# Patient Record
Sex: FEMALE | Race: WHITE | Employment: UNEMPLOYED | URBAN - METROPOLITAN AREA
[De-identification: names, ages, dates, MRNs, and addresses within clinical notes are randomized per-mention and may not be internally consistent; named-entity substitution may affect disease eponyms.]

---

## 2019-11-11 ENCOUNTER — OFFICE VISIT (OUTPATIENT)
Dept: FAMILY MEDICINE CLINIC | Facility: CLINIC | Age: 34
End: 2019-11-11
Payer: COMMERCIAL

## 2019-11-11 VITALS
BODY MASS INDEX: 28.06 KG/M2 | SYSTOLIC BLOOD PRESSURE: 100 MMHG | HEART RATE: 88 BPM | DIASTOLIC BLOOD PRESSURE: 64 MMHG | OXYGEN SATURATION: 97 % | RESPIRATION RATE: 16 BRPM | HEIGHT: 63 IN | WEIGHT: 158.38 LBS | TEMPERATURE: 98 F

## 2019-11-11 DIAGNOSIS — J20.9 ACUTE BRONCHITIS, UNSPECIFIED ORGANISM: Primary | ICD-10-CM

## 2019-11-11 PROCEDURE — 99203 OFFICE O/P NEW LOW 30 MIN: CPT | Performed by: PHYSICIAN ASSISTANT

## 2019-11-11 RX ORDER — AZITHROMYCIN 250 MG/1
TABLET, FILM COATED ORAL
Qty: 6 TABLET | Refills: 0 | Status: SHIPPED | OUTPATIENT
Start: 2019-11-11 | End: 2019-12-05

## 2019-11-11 RX ORDER — ESTRADIOL 1 MG/1
1 TABLET ORAL DAILY
COMMUNITY
End: 2019-12-05

## 2019-11-11 RX ORDER — PRENATAL VIT/IRON FUM/FOLIC AC 27MG-0.8MG
1 TABLET ORAL DAILY
COMMUNITY

## 2019-11-11 RX ORDER — CHLORAL HYDRATE 500 MG
1000 CAPSULE ORAL DAILY
COMMUNITY

## 2019-11-11 NOTE — PROGRESS NOTES
CHIEF COMPLAINT:   Patient presents with:  Cough: chest hurts, stuffy nose x 2 weeks         HPI:   Khai Balderrama is a 37year old female who presents for cough for 3 weeks.  Cough is sometimes productive and the patient is getting into coughing fits that a nourished,in no apparent distress  SKIN: no rashes, no suspicious lesions  EYES: EOMI. PERRL. Conjunctiva normal.  Cornea clear. Lid margins normal.  HENT: Atraumatic, normocephalic. TM's clear bilaterally.   Nostrils patent, nasal mucosa pink and non-in

## 2019-11-11 NOTE — PATIENT INSTRUCTIONS
Patient Declined AVS    Verbal Instructions given      1. Azithromycin  2. Robitussin  3. Increase fluids/rest  4.  Follow up with PCP

## 2019-11-13 ENCOUNTER — TELEPHONE (OUTPATIENT)
Dept: OBGYN CLINIC | Facility: CLINIC | Age: 34
End: 2019-11-13

## 2019-11-13 NOTE — TELEPHONE ENCOUNTER
Returned patient's call. She had an embryo transfer at [facility]. Last appt with them was today. Has appt scheduled with us for 19.    Hx of IVF pregnancy,  in 2017, GDM    Medical problems: denies  Surgeries: bernard   Current medications: estrac

## 2019-11-13 NOTE — TELEPHONE ENCOUNTER
Patient calling to transfer OB care  GA 7 wks 6 days  JOHANA   Insurance [employer] PPO  Good time to return phone call anytime    IVF transfer, will have records sent

## 2019-12-03 ENCOUNTER — TELEPHONE (OUTPATIENT)
Dept: OBGYN CLINIC | Facility: CLINIC | Age: 34
End: 2019-12-03

## 2019-12-03 NOTE — TELEPHONE ENCOUNTER
Faxed over request for medical records to be recieved from [facility]. Received confirmation from fax.

## 2019-12-05 ENCOUNTER — INITIAL PRENATAL (OUTPATIENT)
Dept: OBGYN CLINIC | Facility: CLINIC | Age: 34
End: 2019-12-05
Payer: COMMERCIAL

## 2019-12-05 VITALS
HEIGHT: 63 IN | BODY MASS INDEX: 27.46 KG/M2 | SYSTOLIC BLOOD PRESSURE: 114 MMHG | WEIGHT: 155 LBS | DIASTOLIC BLOOD PRESSURE: 60 MMHG

## 2019-12-05 DIAGNOSIS — Z23 NEED FOR VACCINATION: ICD-10-CM

## 2019-12-05 DIAGNOSIS — Z12.4 SCREENING FOR MALIGNANT NEOPLASM OF THE CERVIX: ICD-10-CM

## 2019-12-05 DIAGNOSIS — Z36.9 PRENATAL SCREENING ENCOUNTER: Primary | ICD-10-CM

## 2019-12-05 DIAGNOSIS — O09.521 AMA (ADVANCED MATERNAL AGE) MULTIGRAVIDA 35+, FIRST TRIMESTER: ICD-10-CM

## 2019-12-05 DIAGNOSIS — O09.811 PREGNANCY RESULTING FROM ASSISTED REPRODUCTIVE TECHNOLOGY IN FIRST TRIMESTER: ICD-10-CM

## 2019-12-05 PROCEDURE — 88175 CYTOPATH C/V AUTO FLUID REDO: CPT | Performed by: OBSTETRICS & GYNECOLOGY

## 2019-12-05 PROCEDURE — 87086 URINE CULTURE/COLONY COUNT: CPT | Performed by: OBSTETRICS & GYNECOLOGY

## 2019-12-05 PROCEDURE — 90686 IIV4 VACC NO PRSV 0.5 ML IM: CPT | Performed by: OBSTETRICS & GYNECOLOGY

## 2019-12-05 PROCEDURE — 90471 IMMUNIZATION ADMIN: CPT | Performed by: OBSTETRICS & GYNECOLOGY

## 2019-12-05 PROCEDURE — 81002 URINALYSIS NONAUTO W/O SCOPE: CPT | Performed by: OBSTETRICS & GYNECOLOGY

## 2019-12-05 NOTE — PROGRESS NOTES
NOB  IVF pregnancy  ET 10/8/19, did not have PGD  PMH: neg  PSH: cholecystectomy  History of gestational DM, discussed, she will watch diet  Prenatal care and course discussed with patient including ultrasounds, genetic testing options, frequency of visits

## 2019-12-16 ENCOUNTER — TELEPHONE (OUTPATIENT)
Dept: OBGYN CLINIC | Facility: CLINIC | Age: 34
End: 2019-12-16

## 2019-12-21 ENCOUNTER — OFFICE VISIT (OUTPATIENT)
Dept: FAMILY MEDICINE CLINIC | Facility: CLINIC | Age: 34
End: 2019-12-21
Payer: COMMERCIAL

## 2019-12-21 VITALS
BODY MASS INDEX: 27.82 KG/M2 | HEIGHT: 63 IN | WEIGHT: 157 LBS | HEART RATE: 80 BPM | DIASTOLIC BLOOD PRESSURE: 71 MMHG | TEMPERATURE: 98 F | RESPIRATION RATE: 16 BRPM | SYSTOLIC BLOOD PRESSURE: 116 MMHG | OXYGEN SATURATION: 99 %

## 2019-12-21 DIAGNOSIS — R51.9 SINUS HEADACHE: Primary | ICD-10-CM

## 2019-12-21 DIAGNOSIS — J06.9 VIRAL UPPER RESPIRATORY TRACT INFECTION: ICD-10-CM

## 2019-12-21 PROCEDURE — 99213 OFFICE O/P EST LOW 20 MIN: CPT | Performed by: FAMILY MEDICINE

## 2019-12-21 NOTE — PROGRESS NOTES
Patient presents with:  Cough: Sinus pressure and headaches- she state she is worried she might have pink eye- she is 14 weeks pregnant      HPI:    Patient is here with ALLISONI symjasen and was given z-ashely, this was about 1 month ago.  She got better with the Immunizations:    Immunization History  Administered            Date(s) Administered    FLULAVAL 6 months & older 0.5 ml Prefilled syringe ([id number])                          12/05/2019        Physical Exam  /71   Pulse 80   Temp 98 °F (36.7 °C) (Tempor The sinuses are air-filled spaces within the bones of the face. They connect to the inside of the nose. Sinusitis is an inflammation of the tissue lining the sinus cavity.  Sinus inflammation can occur during a cold or hay fever (allergies to pollens and ot · You may use over-the-counter decongestants unless a similar medicine was prescribed. Nasal sprays or drops work the fastest. Use one that contains phenylephrine or oxymetazoline. First blow your nose gently to remove mucus. Then apply the spray or drops. · You may use over-the-counter medicine to control pain and fever, unless another pain medicine was prescribed. Talk with your doctor before using acetaminophen or ibuprofen if you have chronic liver or kidney disease.  Also talk with your doctor if you hav

## 2019-12-21 NOTE — PATIENT INSTRUCTIONS
I recommend Mucinex DM, Vicks vaporub, Tylenol. Sinus Headache    The sinuses are air-filled spaces within the bones of the face. They connect to the inside of the nose. Sinusitis is an inflammation of the tissue lining the sinus cavity.  Sinus infl the spray or drops. Don't use decongestant nasal sprays or drops more often than the label says or for more than 3 days. This can make symptoms worse. Nasal sprays or drops prescribed by your doctor typically do not have these limits.  Check with your docto had a stomach ulcer. Aspirin should never be used in anyone under 25years of age who has a fever. It may cause a life-threatening condition called Reye syndrome.   · If antibiotics were given, finish all of them, even if you are feeling better after a few

## 2020-01-02 NOTE — TELEPHONE ENCOUNTER
Received more medical records from [facility]. Given to ma's in [place]

## 2020-01-06 ENCOUNTER — ROUTINE PRENATAL (OUTPATIENT)
Dept: OBGYN CLINIC | Facility: CLINIC | Age: 35
End: 2020-01-06
Payer: COMMERCIAL

## 2020-01-06 VITALS — DIASTOLIC BLOOD PRESSURE: 64 MMHG | SYSTOLIC BLOOD PRESSURE: 120 MMHG | BODY MASS INDEX: 28 KG/M2 | WEIGHT: 158.81 LBS

## 2020-01-06 DIAGNOSIS — Z36.9 PRENATAL SCREENING ENCOUNTER: ICD-10-CM

## 2020-01-06 DIAGNOSIS — O09.819 PREGNANCY RESULTING FROM IN VITRO FERTILIZATION, ANTEPARTUM: Primary | ICD-10-CM

## 2020-01-06 DIAGNOSIS — O09.529 ANTEPARTUM MULTIGRAVIDA OF ADVANCED MATERNAL AGE: ICD-10-CM

## 2020-01-06 LAB — MULTISTIX LOT#: NORMAL NUMERIC

## 2020-01-06 PROCEDURE — 81002 URINALYSIS NONAUTO W/O SCOPE: CPT | Performed by: OBSTETRICS & GYNECOLOGY

## 2020-01-06 NOTE — PROGRESS NOTES
IRVIN.   Doing well. No complaints. Denies abdominal/pelvic pain or vaginal bleeding. Had some sinus congestion and was treating with Mucinex. She has intermittent headaches while she is sick.   Advised to take Tylenol and improve her sleeping habits as

## 2020-01-07 ENCOUNTER — LAB ENCOUNTER (OUTPATIENT)
Dept: LAB | Age: 35
End: 2020-01-07
Attending: OBSTETRICS & GYNECOLOGY
Payer: COMMERCIAL

## 2020-01-07 ENCOUNTER — MED REC SCAN ONLY (OUTPATIENT)
Dept: OBGYN CLINIC | Facility: CLINIC | Age: 35
End: 2020-01-07

## 2020-01-07 DIAGNOSIS — O09.521 AMA (ADVANCED MATERNAL AGE) MULTIGRAVIDA 35+, FIRST TRIMESTER: ICD-10-CM

## 2020-01-07 LAB
ANTIBODY SCREEN: NEGATIVE
BASOPHILS # BLD AUTO: 0.04 X10(3) UL (ref 0–0.2)
BASOPHILS NFR BLD AUTO: 0.5 %
DEPRECATED RDW RBC AUTO: 47.9 FL (ref 35.1–46.3)
EOSINOPHIL # BLD AUTO: 0.06 X10(3) UL (ref 0–0.7)
EOSINOPHIL NFR BLD AUTO: 0.7 %
ERYTHROCYTE [DISTWIDTH] IN BLOOD BY AUTOMATED COUNT: 14.4 % (ref 11–15)
HBV SURFACE AG SER-ACNC: <0.1 [IU]/L
HBV SURFACE AG SERPL QL IA: NONREACTIVE
HCT VFR BLD AUTO: 34.4 % (ref 35–48)
HGB BLD-MCNC: 11.1 G/DL (ref 12–16)
IMM GRANULOCYTES # BLD AUTO: 0.02 X10(3) UL (ref 0–1)
IMM GRANULOCYTES NFR BLD: 0.2 %
LYMPHOCYTES # BLD AUTO: 1.53 X10(3) UL (ref 1–4)
LYMPHOCYTES NFR BLD AUTO: 18.2 %
MCH RBC QN AUTO: 29.6 PG (ref 26–34)
MCHC RBC AUTO-ENTMCNC: 32.3 G/DL (ref 31–37)
MCV RBC AUTO: 91.7 FL (ref 80–100)
MONOCYTES # BLD AUTO: 0.44 X10(3) UL (ref 0.1–1)
MONOCYTES NFR BLD AUTO: 5.2 %
NEUTROPHILS # BLD AUTO: 6.3 X10 (3) UL (ref 1.5–7.7)
NEUTROPHILS # BLD AUTO: 6.3 X10(3) UL (ref 1.5–7.7)
NEUTROPHILS NFR BLD AUTO: 75.2 %
PLATELET # BLD AUTO: 251 10(3)UL (ref 150–450)
RBC # BLD AUTO: 3.75 X10(6)UL (ref 3.8–5.3)
RH BLOOD TYPE: POSITIVE
RUBV IGG SER QL: POSITIVE
RUBV IGG SER-ACNC: 144.1 IU/ML (ref 10–?)
T PALLIDUM AB SER QL IA: NONREACTIVE
WBC # BLD AUTO: 8.4 X10(3) UL (ref 4–11)

## 2020-01-07 PROCEDURE — 86762 RUBELLA ANTIBODY: CPT | Performed by: OBSTETRICS & GYNECOLOGY

## 2020-01-07 PROCEDURE — 87340 HEPATITIS B SURFACE AG IA: CPT | Performed by: OBSTETRICS & GYNECOLOGY

## 2020-01-07 PROCEDURE — 87389 HIV-1 AG W/HIV-1&-2 AB AG IA: CPT | Performed by: OBSTETRICS & GYNECOLOGY

## 2020-01-07 PROCEDURE — 86780 TREPONEMA PALLIDUM: CPT | Performed by: OBSTETRICS & GYNECOLOGY

## 2020-01-07 PROCEDURE — 86901 BLOOD TYPING SEROLOGIC RH(D): CPT | Performed by: OBSTETRICS & GYNECOLOGY

## 2020-01-07 PROCEDURE — 36415 COLL VENOUS BLD VENIPUNCTURE: CPT | Performed by: OBSTETRICS & GYNECOLOGY

## 2020-01-07 PROCEDURE — 85025 COMPLETE CBC W/AUTO DIFF WBC: CPT | Performed by: OBSTETRICS & GYNECOLOGY

## 2020-01-07 PROCEDURE — 86900 BLOOD TYPING SEROLOGIC ABO: CPT | Performed by: OBSTETRICS & GYNECOLOGY

## 2020-01-07 PROCEDURE — 86850 RBC ANTIBODY SCREEN: CPT | Performed by: OBSTETRICS & GYNECOLOGY

## 2020-01-13 ENCOUNTER — TELEPHONE (OUTPATIENT)
Dept: OBGYN CLINIC | Facility: CLINIC | Age: 35
End: 2020-01-13

## 2020-01-14 NOTE — TELEPHONE ENCOUNTER
Normal; female fetus. Notified pt of normal results. Pt does not want to know sex at this time. Pt requesting that we call her  with sex of the baby. Called  on cell #; left message to call back.

## 2020-02-03 ENCOUNTER — ROUTINE PRENATAL (OUTPATIENT)
Dept: OBGYN CLINIC | Facility: CLINIC | Age: 35
End: 2020-02-03
Payer: COMMERCIAL

## 2020-02-03 ENCOUNTER — MED REC SCAN ONLY (OUTPATIENT)
Dept: OBGYN CLINIC | Facility: CLINIC | Age: 35
End: 2020-02-03

## 2020-02-03 VITALS — BODY MASS INDEX: 29 KG/M2 | SYSTOLIC BLOOD PRESSURE: 96 MMHG | WEIGHT: 163.81 LBS | DIASTOLIC BLOOD PRESSURE: 60 MMHG

## 2020-02-03 DIAGNOSIS — Z86.32 HX OF GESTATIONAL DIABETES IN PRIOR PREGNANCY, CURRENTLY PREGNANT, SECOND TRIMESTER: ICD-10-CM

## 2020-02-03 DIAGNOSIS — O09.529 ANTEPARTUM MULTIGRAVIDA OF ADVANCED MATERNAL AGE: ICD-10-CM

## 2020-02-03 DIAGNOSIS — O09.812 PREGNANCY RESULTING FROM ASSISTED REPRODUCTIVE TECHNOLOGY IN SECOND TRIMESTER: ICD-10-CM

## 2020-02-03 DIAGNOSIS — O09.292 HX OF GESTATIONAL DIABETES IN PRIOR PREGNANCY, CURRENTLY PREGNANT, SECOND TRIMESTER: ICD-10-CM

## 2020-02-03 DIAGNOSIS — Z3A.19 19 WEEKS GESTATION OF PREGNANCY: Primary | ICD-10-CM

## 2020-02-03 LAB — MULTISTIX LOT#: NORMAL NUMERIC

## 2020-02-03 PROCEDURE — 81002 URINALYSIS NONAUTO W/O SCOPE: CPT | Performed by: OBSTETRICS & GYNECOLOGY

## 2020-02-03 NOTE — PATIENT INSTRUCTIONS
Medications Safe in Pregnancy  The following over-the-counter medications may be taken safely after 12 weeks gestation by any patient who is pregnant. Please follow the instructions on the package for adult dosage.   If you experience any symptoms prior to stress to your body. One way in which the stress may manifest itself is through an abnormality in sugar metabolism. In non-pregnant patients, this abnormality (a lack of insulin) is known as diabetes.     During pregnancy, the insulin normally secreted ma further testing is necessary.   If the blood glucose is abnormal, this indicates an increased risk of gestational diabetes and will require a formal three hour glucose tolerance test.  This test will be done within two weeks following your abnormal one hour

## 2020-02-03 NOTE — PROGRESS NOTES
IRVIN  Doing well  No complaints.  No LOF/VB/uctx  RH +  Genetic testing nl cf dna (first, quad/AFP)  Anatomy Scan level 2 next week (18-20weeks)  Hx of gdm with previous pregnancy  To do 1hr gct before next visit  rtc 4 wk

## 2020-02-10 ENCOUNTER — TELEPHONE (OUTPATIENT)
Dept: OBGYN CLINIC | Facility: CLINIC | Age: 35
End: 2020-02-10

## 2020-02-10 ENCOUNTER — TELEPHONE (OUTPATIENT)
Dept: PERINATAL CARE | Facility: HOSPITAL | Age: 35
End: 2020-02-10

## 2020-02-10 NOTE — TELEPHONE ENCOUNTER
Spoke to Ollie Medina with MFM (she called office). She stated patient was >30 minutes late for appointment. She was rescheduled until next week. Patient was very upset.  Ollie Medina wanted our office to be aware it was not on their part that she had to be cancelle

## 2020-02-17 NOTE — PROGRESS NOTES
Outpatient Maternal-Fetal Medicine Consultation    Dear Dr. Raj Bran    Thank you for requesting ultrasound evaluation and maternal fetal medicine consultation on your patient Kristi Cooper.   As you are aware she is a 37year old female  with a sing survey.   ____________________________________________________________________________  Dating:  LMP: 09/19/2019 EDC: 06/25/2020 GA by LMP: 21w5d  Current Scan on: 02/18/2020 EDC: 06/27/2020 GA by current scan: 21w3d  The calculation of the gestational age mm.  ____________________________________________________________________________    See PACS/Imaging Tab For Complete Ultrasound Report  I interpreted the results and reviewed them with the patient.     DISCUSSION  During her visit we discussed and reviewe current non-invasive screening options. Currently non-invasive pregnancy testing (NIPT) offers the highest detection rate (with the lowest false positive rate) for the detection of fetal aneuploidy amongst high-risk patients.   The limitations of detailed for allowing me to participate in the care of your patient. Please do not hesitate to contact me if additional questions or concerns arise. Aleksandra Quiros. Maryann Ying M.D.     The majority of the time (>50%) was spent in review of records, consultation and coor

## 2020-02-18 ENCOUNTER — OFFICE VISIT (OUTPATIENT)
Dept: PERINATAL CARE | Facility: HOSPITAL | Age: 35
End: 2020-02-18
Attending: OBSTETRICS & GYNECOLOGY
Payer: COMMERCIAL

## 2020-02-18 VITALS
DIASTOLIC BLOOD PRESSURE: 63 MMHG | BODY MASS INDEX: 29 KG/M2 | HEART RATE: 102 BPM | SYSTOLIC BLOOD PRESSURE: 93 MMHG | WEIGHT: 163 LBS

## 2020-02-18 DIAGNOSIS — O09.529 ANTEPARTUM MULTIGRAVIDA OF ADVANCED MATERNAL AGE: ICD-10-CM

## 2020-02-18 DIAGNOSIS — O09.819 PREGNANCY RESULTING FROM IN VITRO FERTILIZATION, ANTEPARTUM: ICD-10-CM

## 2020-02-18 PROCEDURE — 76811 OB US DETAILED SNGL FETUS: CPT | Performed by: OBSTETRICS & GYNECOLOGY

## 2020-02-18 PROCEDURE — 99243 OFF/OP CNSLTJ NEW/EST LOW 30: CPT | Performed by: OBSTETRICS & GYNECOLOGY

## 2020-03-02 ENCOUNTER — LAB ENCOUNTER (OUTPATIENT)
Dept: LAB | Age: 35
End: 2020-03-02
Attending: OBSTETRICS & GYNECOLOGY
Payer: COMMERCIAL

## 2020-03-02 ENCOUNTER — ROUTINE PRENATAL (OUTPATIENT)
Dept: OBGYN CLINIC | Facility: CLINIC | Age: 35
End: 2020-03-02
Payer: COMMERCIAL

## 2020-03-02 VITALS — DIASTOLIC BLOOD PRESSURE: 64 MMHG | WEIGHT: 166 LBS | BODY MASS INDEX: 29 KG/M2 | SYSTOLIC BLOOD PRESSURE: 108 MMHG

## 2020-03-02 DIAGNOSIS — O09.529 ANTEPARTUM MULTIGRAVIDA OF ADVANCED MATERNAL AGE: ICD-10-CM

## 2020-03-02 DIAGNOSIS — O09.292 HX OF GESTATIONAL DIABETES IN PRIOR PREGNANCY, CURRENTLY PREGNANT, SECOND TRIMESTER: ICD-10-CM

## 2020-03-02 DIAGNOSIS — O09.812 PREGNANCY RESULTING FROM ASSISTED REPRODUCTIVE TECHNOLOGY IN SECOND TRIMESTER: ICD-10-CM

## 2020-03-02 DIAGNOSIS — O09.819 PREGNANCY RESULTING FROM IN VITRO FERTILIZATION, ANTEPARTUM: Primary | ICD-10-CM

## 2020-03-02 DIAGNOSIS — Z3A.19 19 WEEKS GESTATION OF PREGNANCY: ICD-10-CM

## 2020-03-02 DIAGNOSIS — Z86.32 HX OF GESTATIONAL DIABETES IN PRIOR PREGNANCY, CURRENTLY PREGNANT, SECOND TRIMESTER: ICD-10-CM

## 2020-03-02 LAB
BASOPHILS # BLD AUTO: 0.02 X10(3) UL (ref 0–0.2)
BASOPHILS NFR BLD AUTO: 0.2 %
DEPRECATED RDW RBC AUTO: 50.9 FL (ref 35.1–46.3)
EOSINOPHIL # BLD AUTO: 0.07 X10(3) UL (ref 0–0.7)
EOSINOPHIL NFR BLD AUTO: 0.7 %
ERYTHROCYTE [DISTWIDTH] IN BLOOD BY AUTOMATED COUNT: 14.9 % (ref 11–15)
GLUCOSE 1H P GLC SERPL-MCNC: 156 MG/DL
HCT VFR BLD AUTO: 34.5 % (ref 35–48)
HGB BLD-MCNC: 11 G/DL (ref 12–16)
IMM GRANULOCYTES # BLD AUTO: 0.03 X10(3) UL (ref 0–1)
IMM GRANULOCYTES NFR BLD: 0.3 %
LYMPHOCYTES # BLD AUTO: 1.59 X10(3) UL (ref 1–4)
LYMPHOCYTES NFR BLD AUTO: 15.5 %
MCH RBC QN AUTO: 30.1 PG (ref 26–34)
MCHC RBC AUTO-ENTMCNC: 31.9 G/DL (ref 31–37)
MCV RBC AUTO: 94.3 FL (ref 80–100)
MONOCYTES # BLD AUTO: 0.56 X10(3) UL (ref 0.1–1)
MONOCYTES NFR BLD AUTO: 5.5 %
MULTISTIX LOT#: NORMAL NUMERIC
NEUTROPHILS # BLD AUTO: 8 X10 (3) UL (ref 1.5–7.7)
NEUTROPHILS # BLD AUTO: 8 X10(3) UL (ref 1.5–7.7)
NEUTROPHILS NFR BLD AUTO: 77.8 %
PLATELET # BLD AUTO: 198 10(3)UL (ref 150–450)
RBC # BLD AUTO: 3.66 X10(6)UL (ref 3.8–5.3)
WBC # BLD AUTO: 10.3 X10(3) UL (ref 4–11)

## 2020-03-02 PROCEDURE — 81002 URINALYSIS NONAUTO W/O SCOPE: CPT | Performed by: OBSTETRICS & GYNECOLOGY

## 2020-03-02 PROCEDURE — 36415 COLL VENOUS BLD VENIPUNCTURE: CPT | Performed by: OBSTETRICS & GYNECOLOGY

## 2020-03-02 PROCEDURE — 82950 GLUCOSE TEST: CPT | Performed by: OBSTETRICS & GYNECOLOGY

## 2020-03-02 PROCEDURE — 85025 COMPLETE CBC W/AUTO DIFF WBC: CPT | Performed by: OBSTETRICS & GYNECOLOGY

## 2020-03-02 NOTE — PROGRESS NOTES
IRVIN - 23w4d  Doing well. Denies LOF/VB/uctx. +FM.    /64   Wt 166 lb (75.3 kg)   LMP 09/11/2019 (Approximate)   BMI 29.41 kg/m²   RH pos  S/P Anatomy scan - nl level II   1 hr glucose and CBC - ordered    RTC in 4 weeks

## 2020-03-03 DIAGNOSIS — O99.810 ABNORMAL GLUCOSE TOLERANCE TEST (GTT) DURING PREGNANCY, ANTEPARTUM: Primary | ICD-10-CM

## 2020-03-03 NOTE — PROGRESS NOTES
Patient returned call. Reported results and instructed on 3 hr glucose. Questions answered and patient states understanding. Order placed.

## 2020-03-09 NOTE — PROGRESS NOTES
[place] [place]    Dear Dr. Rachael Jara    Thank you for requesting ultrasound evaluation and maternal fetal medicine consultation on your patient Genaro Lr.   As you are aware she is a 37year old female  with a sutherland p of the trachea  Rhythm:  Sinus    ____________________________________________________________________________    See PACS/Imaging Tab For Complete Ultrasound Report  I interpreted the results and reviewed them with the patient.     DISCUSSION  During her v

## 2020-03-10 ENCOUNTER — OFFICE VISIT (OUTPATIENT)
Dept: PERINATAL CARE | Facility: HOSPITAL | Age: 35
End: 2020-03-10
Attending: OBSTETRICS & GYNECOLOGY
Payer: COMMERCIAL

## 2020-03-10 VITALS
BODY MASS INDEX: 29 KG/M2 | HEART RATE: 102 BPM | DIASTOLIC BLOOD PRESSURE: 57 MMHG | WEIGHT: 166 LBS | SYSTOLIC BLOOD PRESSURE: 93 MMHG

## 2020-03-10 DIAGNOSIS — O09.529 ANTEPARTUM MULTIGRAVIDA OF ADVANCED MATERNAL AGE: ICD-10-CM

## 2020-03-10 DIAGNOSIS — O09.819 PREGNANCY RESULTING FROM IN VITRO FERTILIZATION, ANTEPARTUM: ICD-10-CM

## 2020-03-10 PROCEDURE — 93325 DOPPLER ECHO COLOR FLOW MAPG: CPT | Performed by: OBSTETRICS & GYNECOLOGY

## 2020-03-10 PROCEDURE — 99214 OFFICE O/P EST MOD 30 MIN: CPT | Performed by: OBSTETRICS & GYNECOLOGY

## 2020-03-10 PROCEDURE — 76827 ECHO EXAM OF FETAL HEART: CPT | Performed by: OBSTETRICS & GYNECOLOGY

## 2020-03-10 PROCEDURE — 76825 ECHO EXAM OF FETAL HEART: CPT | Performed by: OBSTETRICS & GYNECOLOGY

## 2020-03-17 ENCOUNTER — TELEPHONE (OUTPATIENT)
Dept: OBGYN CLINIC | Facility: CLINIC | Age: 35
End: 2020-03-17

## 2020-03-17 NOTE — TELEPHONE ENCOUNTER
Pt calling states she is to do a 3 hr glucose test but isn't sure if she should wait until next appt on the 30th or get done as soon as possible. Concerned with everything going on if its safe to go to the lab.     Please advise    Future Appointments   Da

## 2020-03-30 ENCOUNTER — MED REC SCAN ONLY (OUTPATIENT)
Dept: OBGYN CLINIC | Facility: CLINIC | Age: 35
End: 2020-03-30

## 2020-03-30 ENCOUNTER — ROUTINE PRENATAL (OUTPATIENT)
Dept: OBGYN CLINIC | Facility: CLINIC | Age: 35
End: 2020-03-30
Payer: COMMERCIAL

## 2020-03-30 VITALS — DIASTOLIC BLOOD PRESSURE: 64 MMHG | BODY MASS INDEX: 30 KG/M2 | WEIGHT: 168.38 LBS | SYSTOLIC BLOOD PRESSURE: 106 MMHG

## 2020-03-30 DIAGNOSIS — O09.529 ANTEPARTUM MULTIGRAVIDA OF ADVANCED MATERNAL AGE: ICD-10-CM

## 2020-03-30 DIAGNOSIS — Z36.9 PRENATAL SCREENING ENCOUNTER: Primary | ICD-10-CM

## 2020-03-30 PROBLEM — O24.410 DIET CONTROLLED GESTATIONAL DIABETES MELLITUS (GDM) IN THIRD TRIMESTER (HCC): Status: ACTIVE | Noted: 2020-03-30

## 2020-03-30 PROBLEM — O24.410 DIET CONTROLLED GESTATIONAL DIABETES MELLITUS (GDM) IN THIRD TRIMESTER: Status: ACTIVE | Noted: 2020-03-30

## 2020-03-30 LAB — MULTISTIX LOT#: NORMAL NUMERIC

## 2020-03-30 PROCEDURE — 81002 URINALYSIS NONAUTO W/O SCOPE: CPT | Performed by: OBSTETRICS & GYNECOLOGY

## 2020-03-30 NOTE — PATIENT INSTRUCTIONS
Tdap Vaccine: What You Need To Know    1. Why Get Vaccinated:    · Tetanus, diphtheria, and pertussis can be very serious diseases, even for adolescents and adults. Tdap vaccine can protect us from these diseases.     · Tetanus (Lockjaw) causes painful mu A similar vaccine, called Td, protects from tetanus and diphtheria, but not pertussis. A Td booster should be given every 10 years. Tdap may be given as one of these boosters if you have not already gotten a dose.   Tdap may also be given after a severe

## 2020-03-31 ENCOUNTER — TELEPHONE (OUTPATIENT)
Dept: OBGYN CLINIC | Facility: CLINIC | Age: 35
End: 2020-03-31

## 2020-03-31 NOTE — TELEPHONE ENCOUNTER
Pt needs a glucometer and supplies; does not need to see diabetic educator. Left message for Diabetic Educator to call back. Looking for direction on type of glucometer to order based on patient insurance.

## 2020-04-01 RX ORDER — LANCETS 33 GAUGE
1 EACH MISCELLANEOUS 4 TIMES DAILY
Qty: 1 BOX | Refills: 5 | Status: SHIPPED | OUTPATIENT
Start: 2020-04-01 | End: 2020-08-17

## 2020-04-01 RX ORDER — BLOOD-GLUCOSE METER
EACH MISCELLANEOUS
Qty: 1 KIT | Refills: 0 | Status: SHIPPED | OUTPATIENT
Start: 2020-04-01 | End: 2020-08-17

## 2020-04-01 RX ORDER — BLOOD SUGAR DIAGNOSTIC
STRIP MISCELLANEOUS
Qty: 100 STRIP | Refills: 5 | Status: ON HOLD | OUTPATIENT
Start: 2020-04-01 | End: 2020-06-29

## 2020-04-01 NOTE — TELEPHONE ENCOUNTER
Left a third message for Diabetic Educator. Will attempt to order a glucometer and supplies. Will f/u with pharmacy if denied. [facility] [employer] with supplies.

## 2020-04-06 ENCOUNTER — TELEPHONE (OUTPATIENT)
Dept: OBGYN CLINIC | Facility: CLINIC | Age: 35
End: 2020-04-06

## 2020-04-06 NOTE — TELEPHONE ENCOUNTER
Pt called in today having some questions about her sugar levels. There have been a couple times that it was low and she got dizzy in between the meals of breakfast and lunch.  Please advise

## 2020-04-06 NOTE — TELEPHONE ENCOUNTER
[id number]   Patient called regarding sporadic low sugars. Patient had abnormal 1 hour GTT and instead of doing 3 hour she wanted to assume she had GDM. Patient was advised to track her sugars for 2 weeks. She stated they have been good generally.  On

## 2020-04-07 ENCOUNTER — TELEPHONE (OUTPATIENT)
Dept: PERINATAL CARE | Facility: HOSPITAL | Age: 35
End: 2020-04-07

## 2020-04-07 NOTE — TELEPHONE ENCOUNTER
Per Dr. Kate Harrison:   I think she could benefit then from GDM teaching. Would she be open to that? Sounds like she needs a snack or more protein for breakfast       Left message for patient to call back.

## 2020-04-13 ENCOUNTER — ROUTINE PRENATAL (OUTPATIENT)
Dept: OBGYN CLINIC | Facility: CLINIC | Age: 35
End: 2020-04-13
Payer: COMMERCIAL

## 2020-04-13 VITALS — WEIGHT: 171 LBS | SYSTOLIC BLOOD PRESSURE: 120 MMHG | BODY MASS INDEX: 30 KG/M2 | DIASTOLIC BLOOD PRESSURE: 70 MMHG

## 2020-04-13 DIAGNOSIS — O24.410 DIET CONTROLLED GESTATIONAL DIABETES MELLITUS (GDM) IN THIRD TRIMESTER: ICD-10-CM

## 2020-04-13 DIAGNOSIS — Z23 NEED FOR VACCINATION: Primary | ICD-10-CM

## 2020-04-13 DIAGNOSIS — O09.529 ANTEPARTUM MULTIGRAVIDA OF ADVANCED MATERNAL AGE: ICD-10-CM

## 2020-04-13 PROCEDURE — 90471 IMMUNIZATION ADMIN: CPT | Performed by: OBSTETRICS & GYNECOLOGY

## 2020-04-13 PROCEDURE — 90715 TDAP VACCINE 7 YRS/> IM: CPT | Performed by: OBSTETRICS & GYNECOLOGY

## 2020-04-13 PROCEDURE — 87389 HIV-1 AG W/HIV-1&-2 AB AG IA: CPT | Performed by: OBSTETRICS & GYNECOLOGY

## 2020-04-13 PROCEDURE — 81002 URINALYSIS NONAUTO W/O SCOPE: CPT | Performed by: OBSTETRICS & GYNECOLOGY

## 2020-04-13 NOTE — PROGRESS NOTES
No C/O, good FM  Discussed a couple of episodes of feeling lightheaded  LLD when feeling lightheaded  TDAP, HIV today  2 weeks

## 2020-04-27 ENCOUNTER — ROUTINE PRENATAL (OUTPATIENT)
Dept: OBGYN CLINIC | Facility: CLINIC | Age: 35
End: 2020-04-27
Payer: COMMERCIAL

## 2020-04-27 VITALS
SYSTOLIC BLOOD PRESSURE: 100 MMHG | DIASTOLIC BLOOD PRESSURE: 64 MMHG | BODY MASS INDEX: 30 KG/M2 | WEIGHT: 170 LBS | TEMPERATURE: 98 F

## 2020-04-27 DIAGNOSIS — O09.521 AMA (ADVANCED MATERNAL AGE) MULTIGRAVIDA 35+, FIRST TRIMESTER: ICD-10-CM

## 2020-04-27 DIAGNOSIS — Z36.9 PRENATAL SCREENING ENCOUNTER: Primary | ICD-10-CM

## 2020-04-27 PROCEDURE — 81002 URINALYSIS NONAUTO W/O SCOPE: CPT | Performed by: OBSTETRICS & GYNECOLOGY

## 2020-05-05 NOTE — PROGRESS NOTES
IRVIN   Doing well, +FM  Denies LOF/VB/uctx  Rh positive, TDAP received, EPDS 0  Fetal movement count given  Abnormal 1 hr GTT, did not complete 3 hr GTT. Hx of GDM. Presumed GDMA1, blood glucose log not reviewed as patient did not bring log.  Advised to send

## 2020-05-05 NOTE — PATIENT INSTRUCTIONS
FETAL MOVEMENT CHART    Begin counting fetal movements at 32 weeks of pregnancy. You may find that your baby is more active after eating or drinking. We want you to time how long it takes to feel 10 movements (kicks, flutters, swishes or rolls).   Elias Marcelino

## 2020-05-06 ENCOUNTER — ROUTINE PRENATAL (OUTPATIENT)
Dept: OBGYN CLINIC | Facility: CLINIC | Age: 35
End: 2020-05-06
Payer: COMMERCIAL

## 2020-05-06 ENCOUNTER — ULTRASOUND ENCOUNTER (OUTPATIENT)
Dept: OBGYN CLINIC | Facility: CLINIC | Age: 35
End: 2020-05-06
Payer: COMMERCIAL

## 2020-05-06 VITALS
DIASTOLIC BLOOD PRESSURE: 66 MMHG | TEMPERATURE: 99 F | BODY MASS INDEX: 30 KG/M2 | SYSTOLIC BLOOD PRESSURE: 112 MMHG | WEIGHT: 172 LBS

## 2020-05-06 DIAGNOSIS — O09.819 PREGNANCY RESULTING FROM IN VITRO FERTILIZATION, ANTEPARTUM: ICD-10-CM

## 2020-05-06 DIAGNOSIS — O09.529 ANTEPARTUM MULTIGRAVIDA OF ADVANCED MATERNAL AGE: ICD-10-CM

## 2020-05-06 DIAGNOSIS — Z36.9 PRENATAL SCREENING ENCOUNTER: ICD-10-CM

## 2020-05-06 DIAGNOSIS — O24.410 DIET CONTROLLED GESTATIONAL DIABETES MELLITUS (GDM) IN THIRD TRIMESTER: Primary | ICD-10-CM

## 2020-05-06 PROCEDURE — 81002 URINALYSIS NONAUTO W/O SCOPE: CPT | Performed by: OBSTETRICS & GYNECOLOGY

## 2020-05-06 PROCEDURE — 76816 OB US FOLLOW-UP PER FETUS: CPT | Performed by: OBSTETRICS & GYNECOLOGY

## 2020-05-18 ENCOUNTER — ROUTINE PRENATAL (OUTPATIENT)
Dept: OBGYN CLINIC | Facility: CLINIC | Age: 35
End: 2020-05-18
Payer: COMMERCIAL

## 2020-05-18 VITALS — WEIGHT: 173.38 LBS | DIASTOLIC BLOOD PRESSURE: 66 MMHG | BODY MASS INDEX: 31 KG/M2 | SYSTOLIC BLOOD PRESSURE: 104 MMHG

## 2020-05-18 DIAGNOSIS — Z36.9 PRENATAL SCREENING ENCOUNTER: Primary | ICD-10-CM

## 2020-05-18 DIAGNOSIS — O09.819 PREGNANCY RESULTING FROM IN VITRO FERTILIZATION, ANTEPARTUM: ICD-10-CM

## 2020-05-18 DIAGNOSIS — O09.529 ANTEPARTUM MULTIGRAVIDA OF ADVANCED MATERNAL AGE: ICD-10-CM

## 2020-05-18 DIAGNOSIS — O24.410 DIET CONTROLLED GESTATIONAL DIABETES MELLITUS (GDM) IN THIRD TRIMESTER: ICD-10-CM

## 2020-05-18 PROCEDURE — 81002 URINALYSIS NONAUTO W/O SCOPE: CPT | Performed by: OBSTETRICS & GYNECOLOGY

## 2020-05-18 NOTE — PATIENT INSTRUCTIONS
Please send me 2 fasting sugars please! Kick Counts    It’s normal to worry about your baby’s health. One way you can know your baby’s doing well is to record the baby’s movements once a day. This is called a kick count.  Remember to take your kick count r

## 2020-05-18 NOTE — PROGRESS NOTES
IRVIN 34w4d    Doing ok, +FM  Having difficulty sleeping and checking sugars as instructed. Brought glucometer today. Reviewed. Difficult to interpret. Postprandials seem ok but fastings are usually at 11:45 or 12.  But then reported they aren't actually fas

## 2020-05-20 ENCOUNTER — TELEPHONE (OUTPATIENT)
Dept: OBGYN CLINIC | Facility: CLINIC | Age: 35
End: 2020-05-20

## 2020-05-20 NOTE — TELEPHONE ENCOUNTER
Received request from Dr. Ernie Brown to contact patient for fasting blood sugars. Contacted patient. She reports FBS of 90 on May 19th and FBS of 97 on May 20th. Routed to Dr. Ernie Brown for review and plan of care. Will call patient back with instructions.

## 2020-05-21 NOTE — TELEPHONE ENCOUNTER
I would like her to please try to do a complete log for the next 5 days. FAROOQ accuchecks and fasting at a normal AM time around 8 AM like we talked about. Please tell her 80 is good, 97 is not. Can we call her on Monday to see how she is?    Thank you

## 2020-05-21 NOTE — TELEPHONE ENCOUNTER
Patient returned call. Instructed her on blood sugar monitoring and the importance of FBS at a normal AM time such as 0800 and then checking again 2 hours after every meal. Requested that she keep track of these and document them.  Requested that she send u

## 2020-06-01 ENCOUNTER — APPOINTMENT (OUTPATIENT)
Dept: OBGYN CLINIC | Facility: CLINIC | Age: 35
End: 2020-06-01
Payer: COMMERCIAL

## 2020-06-01 ENCOUNTER — ROUTINE PRENATAL (OUTPATIENT)
Dept: OBGYN CLINIC | Facility: CLINIC | Age: 35
End: 2020-06-01
Payer: COMMERCIAL

## 2020-06-01 VITALS
TEMPERATURE: 98 F | SYSTOLIC BLOOD PRESSURE: 112 MMHG | BODY MASS INDEX: 32 KG/M2 | WEIGHT: 178 LBS | DIASTOLIC BLOOD PRESSURE: 64 MMHG

## 2020-06-01 DIAGNOSIS — O24.410 DIET CONTROLLED GESTATIONAL DIABETES MELLITUS (GDM) IN THIRD TRIMESTER: ICD-10-CM

## 2020-06-01 DIAGNOSIS — Z36.9 PRENATAL SCREENING ENCOUNTER: Primary | ICD-10-CM

## 2020-06-01 DIAGNOSIS — O09.521 AMA (ADVANCED MATERNAL AGE) MULTIGRAVIDA 35+, FIRST TRIMESTER: ICD-10-CM

## 2020-06-01 PROCEDURE — 87653 STREP B DNA AMP PROBE: CPT | Performed by: OBSTETRICS & GYNECOLOGY

## 2020-06-01 PROCEDURE — 83036 HEMOGLOBIN GLYCOSYLATED A1C: CPT | Performed by: OBSTETRICS & GYNECOLOGY

## 2020-06-01 PROCEDURE — 59025 FETAL NON-STRESS TEST: CPT | Performed by: OBSTETRICS & GYNECOLOGY

## 2020-06-01 PROCEDURE — 87081 CULTURE SCREEN ONLY: CPT | Performed by: OBSTETRICS & GYNECOLOGY

## 2020-06-01 PROCEDURE — 81002 URINALYSIS NONAUTO W/O SCOPE: CPT | Performed by: OBSTETRICS & GYNECOLOGY

## 2020-06-01 NOTE — PROGRESS NOTES
No C/O, good FM  Sugars mostly good, will check HgA1C  Cx: 1 cm/50%/-2  GBS done  LI given, ?  Asked about IOL  1 week

## 2020-06-01 NOTE — PATIENT INSTRUCTIONS
Labor Instructions    How do I know if it’s true labor? • One of the most important aspects of any pregnancy is being able to recognize the onset of labor.   Unfortunately, on occasion it can be difficult or confusing, especially if you have had one or mor of the contractions. Having regular (usually closer), longer lasting (35-70 seconds), and sharper (more painful) contractions are the common symptoms of actual labor.   The second way in which labor can begin which occurs in approximately 30% of all patien the room during your labor. During the delivery, the nurses will inform you of the hospital policy and how many coaches are allowed. You may desire pain medication or anesthesia for pain.   You probably discussed some aspects of pain medication with us dur Please call the office within a few days after you are discharged from the hospital to schedule your post-partum visit, which is usually 4-6 weeks after delivery. Any medications necessary will be discussed on an individual basis.   If you decide to erin

## 2020-06-08 ENCOUNTER — ROUTINE PRENATAL (OUTPATIENT)
Dept: OBGYN CLINIC | Facility: CLINIC | Age: 35
End: 2020-06-08
Payer: COMMERCIAL

## 2020-06-08 ENCOUNTER — ULTRASOUND ENCOUNTER (OUTPATIENT)
Dept: OBGYN CLINIC | Facility: CLINIC | Age: 35
End: 2020-06-08
Payer: COMMERCIAL

## 2020-06-08 VITALS
WEIGHT: 178 LBS | TEMPERATURE: 99 F | SYSTOLIC BLOOD PRESSURE: 118 MMHG | BODY MASS INDEX: 32 KG/M2 | DIASTOLIC BLOOD PRESSURE: 70 MMHG

## 2020-06-08 DIAGNOSIS — O09.521 AMA (ADVANCED MATERNAL AGE) MULTIGRAVIDA 35+, FIRST TRIMESTER: ICD-10-CM

## 2020-06-08 DIAGNOSIS — Z3A.37 37 WEEKS GESTATION OF PREGNANCY: ICD-10-CM

## 2020-06-08 DIAGNOSIS — O24.410 DIET CONTROLLED GESTATIONAL DIABETES MELLITUS (GDM) IN THIRD TRIMESTER: ICD-10-CM

## 2020-06-08 DIAGNOSIS — Z36.9 PRENATAL SCREENING ENCOUNTER: Primary | ICD-10-CM

## 2020-06-08 PROCEDURE — 76816 OB US FOLLOW-UP PER FETUS: CPT | Performed by: OBSTETRICS & GYNECOLOGY

## 2020-06-08 PROCEDURE — 81002 URINALYSIS NONAUTO W/O SCOPE: CPT | Performed by: OBSTETRICS & GYNECOLOGY

## 2020-06-08 PROCEDURE — 76819 FETAL BIOPHYS PROFIL W/O NST: CPT | Performed by: OBSTETRICS & GYNECOLOGY

## 2020-06-08 NOTE — PROGRESS NOTES
IRVIN  Doing well, +FM  Denies VB/LOF/uctx  Mode of delivery:   anticipated  SVE def   GBS collected (35-37)pos  RTC 1 week  bpp /, aga  Good sugars

## 2020-06-08 NOTE — PROGRESS NOTES
Follow up u/s    iup at 37w4d  Measuring 36w4d  Efw 3138g ( 48.8 %)  Pos: vertex  fhr 157  chon 10.95 cm

## 2020-06-10 ENCOUNTER — HOSPITAL ENCOUNTER (OUTPATIENT)
Facility: HOSPITAL | Age: 35
Setting detail: OBSERVATION
Discharge: HOME | End: 2020-06-10
Attending: OBSTETRICS & GYNECOLOGY | Admitting: OBSTETRICS & GYNECOLOGY
Payer: COMMERCIAL

## 2020-06-10 ENCOUNTER — TELEPHONE (OUTPATIENT)
Dept: OBGYN CLINIC | Facility: CLINIC | Age: 35
End: 2020-06-10

## 2020-06-10 VITALS
DIASTOLIC BLOOD PRESSURE: 66 MMHG | WEIGHT: 178 LBS | SYSTOLIC BLOOD PRESSURE: 116 MMHG | BODY MASS INDEX: 31.54 KG/M2 | TEMPERATURE: 98 F | HEIGHT: 63 IN | HEART RATE: 105 BPM | RESPIRATION RATE: 16 BRPM

## 2020-06-10 PROBLEM — Z34.90 PREGNANCY (HCC): Status: ACTIVE | Noted: 2020-06-10

## 2020-06-10 PROBLEM — Z34.90 PREGNANCY: Status: ACTIVE | Noted: 2020-06-10

## 2020-06-10 PROCEDURE — 99213 OFFICE O/P EST LOW 20 MIN: CPT

## 2020-06-10 PROCEDURE — 59025 FETAL NON-STRESS TEST: CPT

## 2020-06-10 NOTE — TELEPHONE ENCOUNTER
Patient called the office at 11:30 stating she had been prasanna painfully since 9:30 am. She states they are not letting up, coming back to back wrapping across her entire abdomen. She notes good fetal movement. No other signs of labor.  I have instruct

## 2020-06-10 NOTE — NST
Nonstress Test   Patient: Chin Alarcon    Gestation: 37w6d    NST:       Variability: Moderate           Accelerations: Yes           Decelerations: None            Baseline: 145 BPM           Uterine Irritability: No           Contractions: Irregular

## 2020-06-10 NOTE — PROGRESS NOTES
Pt is a 38year old female admitted to TRG4/TRG4-A. Patient presents with:  R/o Labor: prasanna since this morning, denies leaking of fluid     Pt is  37w6d intra-uterine pregnancy. History obtained. Oriented to room, staff, and plan of care.

## 2020-06-15 ENCOUNTER — APPOINTMENT (OUTPATIENT)
Dept: OBGYN CLINIC | Facility: CLINIC | Age: 35
End: 2020-06-15
Payer: COMMERCIAL

## 2020-06-15 ENCOUNTER — ROUTINE PRENATAL (OUTPATIENT)
Dept: OBGYN CLINIC | Facility: CLINIC | Age: 35
End: 2020-06-15
Payer: COMMERCIAL

## 2020-06-15 VITALS
WEIGHT: 179.19 LBS | BODY MASS INDEX: 32 KG/M2 | TEMPERATURE: 98 F | SYSTOLIC BLOOD PRESSURE: 106 MMHG | DIASTOLIC BLOOD PRESSURE: 70 MMHG

## 2020-06-15 DIAGNOSIS — O24.410 DIET CONTROLLED GESTATIONAL DIABETES MELLITUS (GDM) IN THIRD TRIMESTER: ICD-10-CM

## 2020-06-15 DIAGNOSIS — O09.819 PREGNANCY RESULTING FROM IN VITRO FERTILIZATION, ANTEPARTUM: ICD-10-CM

## 2020-06-15 DIAGNOSIS — Z34.93 ENCOUNTER FOR SUPERVISION OF NORMAL PREGNANCY IN THIRD TRIMESTER, UNSPECIFIED GRAVIDITY: Primary | ICD-10-CM

## 2020-06-15 DIAGNOSIS — O09.529 ANTEPARTUM MULTIGRAVIDA OF ADVANCED MATERNAL AGE: ICD-10-CM

## 2020-06-15 PROCEDURE — 1111F DSCHRG MED/CURRENT MED MERGE: CPT | Performed by: OBSTETRICS & GYNECOLOGY

## 2020-06-15 PROCEDURE — 59025 FETAL NON-STRESS TEST: CPT | Performed by: OBSTETRICS & GYNECOLOGY

## 2020-06-15 PROCEDURE — 81002 URINALYSIS NONAUTO W/O SCOPE: CPT | Performed by: OBSTETRICS & GYNECOLOGY

## 2020-06-15 NOTE — PATIENT INSTRUCTIONS
Labor Instructions    How do I know if it’s true labor? • One of the most important aspects of any pregnancy is being able to recognize the onset of labor.   Unfortunately, on occasion it can be difficult or confusing, especially if you have had one or mor of the contractions. Having regular (usually closer), longer lasting (35-70 seconds), and sharper (more painful) contractions are the common symptoms of actual labor.   The second way in which labor can begin which occurs in approximately 30% of all patien the room during your labor. During the delivery, the nurses will inform you of the hospital policy and how many coaches are allowed. You may desire pain medication or anesthesia for pain.   You probably discussed some aspects of pain medication with us dur Please call the office within a few days after you are discharged from the hospital to schedule your post-partum visit, which is usually 4-6 weeks after delivery. Any medications necessary will be discussed on an individual basis.   If you decide to erin Time M T W Th F S S                                                                                                           Time M T W Th

## 2020-06-15 NOTE — PROGRESS NOTES
IRVIN    GA: 38w4d   06/15/20  1535   BP: 106/70   Temp: 98 °F (36.7 °C)   TempSrc: Oral   Weight: 179 lb 3.2 oz (81.3 kg)       Doing well, +FM  Denies LOF/VB/uctx  Mode of delivery:  anticipated  SVE /-2   GBS positive, will need prophylatic

## 2020-06-19 ENCOUNTER — MED REC SCAN ONLY (OUTPATIENT)
Dept: OBGYN CLINIC | Facility: CLINIC | Age: 35
End: 2020-06-19

## 2020-06-22 ENCOUNTER — APPOINTMENT (OUTPATIENT)
Dept: OBGYN CLINIC | Facility: CLINIC | Age: 35
End: 2020-06-22
Payer: COMMERCIAL

## 2020-06-22 ENCOUNTER — ROUTINE PRENATAL (OUTPATIENT)
Dept: OBGYN CLINIC | Facility: CLINIC | Age: 35
End: 2020-06-22
Payer: COMMERCIAL

## 2020-06-22 VITALS — DIASTOLIC BLOOD PRESSURE: 64 MMHG | BODY MASS INDEX: 32 KG/M2 | WEIGHT: 182.19 LBS | SYSTOLIC BLOOD PRESSURE: 100 MMHG

## 2020-06-22 DIAGNOSIS — O24.410 DIET CONTROLLED GESTATIONAL DIABETES MELLITUS (GDM) IN THIRD TRIMESTER: ICD-10-CM

## 2020-06-22 DIAGNOSIS — Z3A.39 39 WEEKS GESTATION OF PREGNANCY: ICD-10-CM

## 2020-06-22 DIAGNOSIS — O09.529 ANTEPARTUM MULTIGRAVIDA OF ADVANCED MATERNAL AGE: ICD-10-CM

## 2020-06-22 DIAGNOSIS — Z36.9 PRENATAL SCREENING ENCOUNTER: Primary | ICD-10-CM

## 2020-06-22 PROCEDURE — 81002 URINALYSIS NONAUTO W/O SCOPE: CPT | Performed by: OBSTETRICS & GYNECOLOGY

## 2020-06-22 PROCEDURE — 59025 FETAL NON-STRESS TEST: CPT | Performed by: OBSTETRICS & GYNECOLOGY

## 2020-06-22 NOTE — PROGRESS NOTES
sg 1.025  Trace edema  Sugars good  D/w pt IOL would like to wait to due date  IRVIN  Doing well, +FM  Denies VB/LOF/uctx  Mode of delivery:   anticipated  SVE /-1   GBS collected (35-37)neg  RTC 1 week  NST reactive

## 2020-06-25 ENCOUNTER — ROUTINE PRENATAL (OUTPATIENT)
Dept: OBGYN CLINIC | Facility: CLINIC | Age: 35
End: 2020-06-25
Payer: COMMERCIAL

## 2020-06-25 ENCOUNTER — APPOINTMENT (OUTPATIENT)
Dept: OBGYN CLINIC | Facility: CLINIC | Age: 35
End: 2020-06-25
Payer: COMMERCIAL

## 2020-06-25 VITALS — DIASTOLIC BLOOD PRESSURE: 70 MMHG | BODY MASS INDEX: 33 KG/M2 | WEIGHT: 185 LBS | SYSTOLIC BLOOD PRESSURE: 110 MMHG

## 2020-06-25 DIAGNOSIS — O09.819 PREGNANCY RESULTING FROM IN VITRO FERTILIZATION, ANTEPARTUM: ICD-10-CM

## 2020-06-25 DIAGNOSIS — Z36.9 PRENATAL SCREENING ENCOUNTER: Primary | ICD-10-CM

## 2020-06-25 DIAGNOSIS — O24.410 DIET CONTROLLED GESTATIONAL DIABETES MELLITUS (GDM) IN THIRD TRIMESTER: ICD-10-CM

## 2020-06-25 DIAGNOSIS — O09.529 ANTEPARTUM MULTIGRAVIDA OF ADVANCED MATERNAL AGE: ICD-10-CM

## 2020-06-25 PROCEDURE — 59025 FETAL NON-STRESS TEST: CPT | Performed by: NURSE PRACTITIONER

## 2020-06-25 PROCEDURE — 81002 URINALYSIS NONAUTO W/O SCOPE: CPT | Performed by: NURSE PRACTITIONER

## 2020-06-25 NOTE — PROGRESS NOTES
IRVIN  Doing well, +FM  Denies VB/LOF/uctx  2+ edema to BLE, advised increased fluids, low sodium and processed foods. Elevate when possible. Mode of delivery:  Will schedule IOL   Labor precautions discussed  RTC 3-4 days with NST  NST reactive

## 2020-06-26 ENCOUNTER — TELEPHONE (OUTPATIENT)
Dept: OBGYN CLINIC | Facility: CLINIC | Age: 35
End: 2020-06-26

## 2020-06-26 NOTE — TELEPHONE ENCOUNTER
IOL/pitocin scheduled by Jolanta Walker for 6/30/20 at 0800.     Scheduling form completed and faxed to labor and delivery  Added to calendar  Copy to file in [place] [facility]

## 2020-06-27 ENCOUNTER — HOSPITAL ENCOUNTER (INPATIENT)
Facility: HOSPITAL | Age: 35
LOS: 2 days | Discharge: HOME | End: 2020-06-29
Attending: OBSTETRICS & GYNECOLOGY | Admitting: OBSTETRICS & GYNECOLOGY
Payer: COMMERCIAL

## 2020-06-27 PROBLEM — Z34.90 NORMAL PREGNANCY (HCC): Status: ACTIVE | Noted: 2020-06-27

## 2020-06-27 PROBLEM — Z34.90 NORMAL PREGNANCY: Status: ACTIVE | Noted: 2020-06-27

## 2020-06-27 LAB
ANTIBODY SCREEN: NEGATIVE
BASOPHILS # BLD AUTO: 0.03 X10(3) UL (ref 0–0.2)
BASOPHILS # BLD AUTO: 0.04 X10(3) UL (ref 0–0.2)
BASOPHILS NFR BLD AUTO: 0.2 %
BASOPHILS NFR BLD AUTO: 0.3 %
DEPRECATED RDW RBC AUTO: 46.7 FL (ref 35.1–46.3)
DEPRECATED RDW RBC AUTO: 47.3 FL (ref 35.1–46.3)
EOSINOPHIL # BLD AUTO: 0.06 X10(3) UL (ref 0–0.7)
EOSINOPHIL # BLD AUTO: 0.09 X10(3) UL (ref 0–0.7)
EOSINOPHIL NFR BLD AUTO: 0.4 %
EOSINOPHIL NFR BLD AUTO: 0.5 %
ERYTHROCYTE [DISTWIDTH] IN BLOOD BY AUTOMATED COUNT: 13.8 % (ref 11–15)
ERYTHROCYTE [DISTWIDTH] IN BLOOD BY AUTOMATED COUNT: 13.9 % (ref 11–15)
HCT VFR BLD AUTO: 39.8 % (ref 35–48)
HCT VFR BLD AUTO: 41.6 % (ref 35–48)
HGB BLD-MCNC: 12.5 G/DL (ref 12–16)
HGB BLD-MCNC: 13.5 G/DL (ref 12–16)
IMM GRANULOCYTES # BLD AUTO: 0.06 X10(3) UL (ref 0–1)
IMM GRANULOCYTES # BLD AUTO: 0.23 X10(3) UL (ref 0–1)
IMM GRANULOCYTES NFR BLD: 0.4 %
IMM GRANULOCYTES NFR BLD: 1.2 %
LYMPHOCYTES # BLD AUTO: 1.43 X10(3) UL (ref 1–4)
LYMPHOCYTES # BLD AUTO: 2.53 X10(3) UL (ref 1–4)
LYMPHOCYTES NFR BLD AUTO: 17.5 %
LYMPHOCYTES NFR BLD AUTO: 7.6 %
MCH RBC QN AUTO: 29.3 PG (ref 26–34)
MCH RBC QN AUTO: 29.9 PG (ref 26–34)
MCHC RBC AUTO-ENTMCNC: 31.4 G/DL (ref 31–37)
MCHC RBC AUTO-ENTMCNC: 32.5 G/DL (ref 31–37)
MCV RBC AUTO: 92.2 FL (ref 80–100)
MCV RBC AUTO: 93.4 FL (ref 80–100)
MONOCYTES # BLD AUTO: 0.62 X10(3) UL (ref 0.1–1)
MONOCYTES # BLD AUTO: 0.89 X10(3) UL (ref 0.1–1)
MONOCYTES NFR BLD AUTO: 3.3 %
MONOCYTES NFR BLD AUTO: 6.2 %
NEUTROPHILS # BLD AUTO: 10.86 X10 (3) UL (ref 1.5–7.7)
NEUTROPHILS # BLD AUTO: 10.86 X10(3) UL (ref 1.5–7.7)
NEUTROPHILS # BLD AUTO: 16.48 X10 (3) UL (ref 1.5–7.7)
NEUTROPHILS # BLD AUTO: 16.48 X10(3) UL (ref 1.5–7.7)
NEUTROPHILS NFR BLD AUTO: 75.2 %
NEUTROPHILS NFR BLD AUTO: 87.2 %
PLATELET # BLD AUTO: 145 10(3)UL (ref 150–450)
PLATELET # BLD AUTO: 167 10(3)UL (ref 150–450)
RBC # BLD AUTO: 4.26 X10(6)UL (ref 3.8–5.3)
RBC # BLD AUTO: 4.51 X10(6)UL (ref 3.8–5.3)
RH BLOOD TYPE: POSITIVE
SARS-COV-2 RNA RESP QL NAA+PROBE: NOT DETECTED
T PALLIDUM AB SER QL IA: NONREACTIVE
WBC # BLD AUTO: 14.4 X10(3) UL (ref 4–11)
WBC # BLD AUTO: 18.9 X10(3) UL (ref 4–11)

## 2020-06-27 PROCEDURE — 0KQM0ZZ REPAIR PERINEUM MUSCLE, OPEN APPROACH: ICD-10-PCS | Performed by: OBSTETRICS & GYNECOLOGY

## 2020-06-27 PROCEDURE — 59400 OBSTETRICAL CARE: CPT | Performed by: OBSTETRICS & GYNECOLOGY

## 2020-06-27 PROCEDURE — 0UJD7ZZ INSPECTION OF UTERUS AND CERVIX, VIA NATURAL OR ARTIFICIAL OPENING: ICD-10-PCS | Performed by: OBSTETRICS & GYNECOLOGY

## 2020-06-27 RX ORDER — METHYLERGONOVINE MALEATE 0.2 MG/ML
INJECTION INTRAVENOUS
Status: DISCONTINUED
Start: 2020-06-27 | End: 2020-06-27 | Stop reason: WASHOUT

## 2020-06-27 RX ORDER — METHYLERGONOVINE MALEATE 0.2 MG/ML
0.2 INJECTION INTRAVENOUS ONCE
Status: DISCONTINUED | OUTPATIENT
Start: 2020-06-27 | End: 2020-06-29

## 2020-06-27 RX ORDER — MISOPROSTOL 200 UG/1
TABLET ORAL
Status: DISPENSED
Start: 2020-06-27 | End: 2020-06-27

## 2020-06-27 RX ORDER — SIMETHICONE 80 MG
80 TABLET,CHEWABLE ORAL 3 TIMES DAILY PRN
Status: DISCONTINUED | OUTPATIENT
Start: 2020-06-27 | End: 2020-06-29

## 2020-06-27 RX ORDER — ONDANSETRON 2 MG/ML
4 INJECTION INTRAMUSCULAR; INTRAVENOUS EVERY 6 HOURS PRN
Status: DISCONTINUED | OUTPATIENT
Start: 2020-06-27 | End: 2020-06-27

## 2020-06-27 RX ORDER — IBUPROFEN 600 MG/1
600 TABLET ORAL EVERY 6 HOURS PRN
Status: DISCONTINUED | OUTPATIENT
Start: 2020-06-27 | End: 2020-06-27 | Stop reason: HOSPADM

## 2020-06-27 RX ORDER — AMMONIA INHALANTS 0.04 G/.3ML
0.3 INHALANT RESPIRATORY (INHALATION) AS NEEDED
Status: DISCONTINUED | OUTPATIENT
Start: 2020-06-27 | End: 2020-06-27 | Stop reason: HOSPADM

## 2020-06-27 RX ORDER — DEXTROSE, SODIUM CHLORIDE, SODIUM LACTATE, POTASSIUM CHLORIDE, AND CALCIUM CHLORIDE 5; .6; .31; .03; .02 G/100ML; G/100ML; G/100ML; G/100ML; G/100ML
INJECTION, SOLUTION INTRAVENOUS AS NEEDED
Status: DISCONTINUED | OUTPATIENT
Start: 2020-06-27 | End: 2020-06-27 | Stop reason: HOSPADM

## 2020-06-27 RX ORDER — IBUPROFEN 600 MG/1
600 TABLET ORAL EVERY 6 HOURS PRN
Status: DISCONTINUED | OUTPATIENT
Start: 2020-06-27 | End: 2020-06-27

## 2020-06-27 RX ORDER — METHYLERGONOVINE MALEATE 0.2 MG/ML
INJECTION INTRAVENOUS
Status: COMPLETED
Start: 2020-06-27 | End: 2020-06-27

## 2020-06-27 RX ORDER — TERBUTALINE SULFATE 1 MG/ML
0.25 INJECTION, SOLUTION SUBCUTANEOUS AS NEEDED
Status: DISCONTINUED | OUTPATIENT
Start: 2020-06-27 | End: 2020-06-27 | Stop reason: HOSPADM

## 2020-06-27 RX ORDER — SODIUM CHLORIDE, SODIUM LACTATE, POTASSIUM CHLORIDE, CALCIUM CHLORIDE 600; 310; 30; 20 MG/100ML; MG/100ML; MG/100ML; MG/100ML
INJECTION, SOLUTION INTRAVENOUS CONTINUOUS
Status: DISCONTINUED | OUTPATIENT
Start: 2020-06-27 | End: 2020-06-27 | Stop reason: HOSPADM

## 2020-06-27 RX ORDER — HYDROCODONE BITARTRATE AND ACETAMINOPHEN 5; 325 MG/1; MG/1
1 TABLET ORAL EVERY 6 HOURS PRN
Status: DISCONTINUED | OUTPATIENT
Start: 2020-06-27 | End: 2020-06-29

## 2020-06-27 RX ORDER — TERBUTALINE SULFATE 1 MG/ML
0.25 INJECTION, SOLUTION SUBCUTANEOUS AS NEEDED
Status: DISCONTINUED | OUTPATIENT
Start: 2020-06-27 | End: 2020-06-27

## 2020-06-27 RX ORDER — AMMONIA INHALANTS 0.04 G/.3ML
0.3 INHALANT RESPIRATORY (INHALATION) AS NEEDED
Status: DISCONTINUED | OUTPATIENT
Start: 2020-06-27 | End: 2020-06-27

## 2020-06-27 RX ORDER — TRISODIUM CITRATE DIHYDRATE AND CITRIC ACID MONOHYDRATE 500; 334 MG/5ML; MG/5ML
30 SOLUTION ORAL AS NEEDED
Status: DISCONTINUED | OUTPATIENT
Start: 2020-06-27 | End: 2020-06-27 | Stop reason: HOSPADM

## 2020-06-27 RX ORDER — TRISODIUM CITRATE DIHYDRATE AND CITRIC ACID MONOHYDRATE 500; 334 MG/5ML; MG/5ML
30 SOLUTION ORAL AS NEEDED
Status: DISCONTINUED | OUTPATIENT
Start: 2020-06-27 | End: 2020-06-27

## 2020-06-27 RX ORDER — ACETAMINOPHEN 500 MG
500 TABLET ORAL EVERY 6 HOURS PRN
Status: DISCONTINUED | OUTPATIENT
Start: 2020-06-27 | End: 2020-06-27

## 2020-06-27 RX ORDER — ACETAMINOPHEN 325 MG/1
650 TABLET ORAL EVERY 6 HOURS PRN
Status: DISCONTINUED | OUTPATIENT
Start: 2020-06-27 | End: 2020-06-29

## 2020-06-27 RX ORDER — MISOPROSTOL 200 UG/1
1000 TABLET ORAL ONCE
Status: COMPLETED | OUTPATIENT
Start: 2020-06-27 | End: 2020-06-27

## 2020-06-27 RX ORDER — ONDANSETRON 2 MG/ML
4 INJECTION INTRAMUSCULAR; INTRAVENOUS EVERY 6 HOURS PRN
Status: DISCONTINUED | OUTPATIENT
Start: 2020-06-27 | End: 2020-06-27 | Stop reason: HOSPADM

## 2020-06-27 RX ORDER — DOCUSATE SODIUM 100 MG/1
100 CAPSULE, LIQUID FILLED ORAL 2 TIMES DAILY
Status: DISCONTINUED | OUTPATIENT
Start: 2020-06-27 | End: 2020-06-29

## 2020-06-27 RX ORDER — ACETAMINOPHEN 500 MG
500 TABLET ORAL EVERY 6 HOURS PRN
Status: DISCONTINUED | OUTPATIENT
Start: 2020-06-27 | End: 2020-06-27 | Stop reason: HOSPADM

## 2020-06-27 RX ORDER — BISACODYL 10 MG
10 SUPPOSITORY, RECTAL RECTAL ONCE AS NEEDED
Status: DISCONTINUED | OUTPATIENT
Start: 2020-06-27 | End: 2020-06-29

## 2020-06-27 RX ORDER — ZOLPIDEM TARTRATE 5 MG/1
5 TABLET ORAL NIGHTLY PRN
Status: DISCONTINUED | OUTPATIENT
Start: 2020-06-27 | End: 2020-06-29

## 2020-06-27 RX ORDER — IBUPROFEN 600 MG/1
600 TABLET ORAL EVERY 6 HOURS
Status: DISCONTINUED | OUTPATIENT
Start: 2020-06-27 | End: 2020-06-29

## 2020-06-27 RX ORDER — METHYLERGONOVINE MALEATE 0.2 MG/ML
0.2 INJECTION INTRAVENOUS ONCE
Status: COMPLETED | OUTPATIENT
Start: 2020-06-27 | End: 2020-06-27

## 2020-06-27 RX ORDER — OXYTOCIN 10 [USP'U]/ML
INJECTION, SOLUTION INTRAMUSCULAR; INTRAVENOUS
Status: DISPENSED
Start: 2020-06-27 | End: 2020-06-28

## 2020-06-27 NOTE — PLAN OF CARE
Problem: Patient/Family Goals  Goal: Patient/Family Long Term Goal  Description  Patient's Long Term Goal: Safe Delivery    Interventions:    - See additional Care Plan goals for specific interventions   6/27/2020 1540 by Irene Vigil RN  Outcome:

## 2020-06-27 NOTE — PROGRESS NOTES
Had episode of bleeding    Uterine exploration with evacuation of clot  Degroot catheter placed  Cbc drawn  Repeat methergine x 1  /65 (BP Location: Right arm)   Pulse 86   Resp 18   Ht 63\"   Wt 185 lb (83.9 kg)   LMP 09/11/2019 (Approximate)   Breast

## 2020-06-27 NOTE — PROGRESS NOTES
Patient sat on the bed and stable. Patient stoodup and stable with guard x 2. As patient assisted to wheelchair she felt faint and fainted in the bed. Assisted to lying down position with tech. VS /WNL, O2 sat 100 % on room air/ Bleeding /WNL .  Dr Stacey Larson

## 2020-06-27 NOTE — PLAN OF CARE
Problem: BIRTH - VAGINAL/ SECTION  Goal: Fetal and maternal status remain reassuring during the birth process  Description  INTERVENTIONS:  - Monitor vital signs  - Monitor fetal heart rate  - Monitor uterine activity  - Monitor labor progression Patient/Family Long Term Goal  Description  Patient's Long Term Goal: Safe Delivery    Interventions:    - See additional Care Plan goals for specific interventions   6/27/2020 1540 by Julien Jimenez RN  Outcome: Completed  6/27/2020 1540 by Christine Floyd

## 2020-06-27 NOTE — L&D DELIVERY NOTE
Sade Molina, Girl [[id number]]    Labor Events     labor?:  No   steroids?:  None  Antibiotics received during labor?:  Yes  Antibiotics (enter # doses in comment):  ampicillin  Rupture date/time:  2020 0939     Rupture type:  AROM  Fluid Respiratory effort Absent Weak cry; hypoventilation Good, crying              1 Minute:   5 Minute:   10 Minute:   15 Minute:   20 Minute:     Skin color: 0  1       Heart rate: 2  2       Reflex irritablity: 2  2       Muscle tone: 2  2       Respirator vaginal portion was approximated with a running locking  2.0 rapide vicryl. A crown stitch was placed and tied. The perineum was approximated with one 2.0 rapide vicryl suture.  The skin was approximated with 2.0 rapide vicryl in a interrupted subcuticular

## 2020-06-27 NOTE — PROGRESS NOTES
Pt is a 38year old female admitted to 115/115-A, Patient presents with:  Laboring: sve done at the bedside, 10 cm/bulging bag     Pt is 40w2d intra-uterine pregnancy. Denies any leaking of fluid. Reports +fetal movement.    History obtained, consents kiesha

## 2020-06-27 NOTE — PROGRESS NOTES
Called with bleeding    Has received 60 u pitocin   Methergine 0.2 mgm im x 1  cytotec 1000 micrograms per rectum    Just massaged uterus, no bleeding noted  Fundus firm    Will continue to observe at this time  Cont ivf with 20 units of pitocin     06/27/

## 2020-06-27 NOTE — H&P
[address] [facility] Patient Status:  Inpatient    4/15/1985 MRN [id number]   Location [facility] Attending Melita Duvall MD   [facility] Day # 0 PCP Tena Yeh DO     Subjective:  Claire Irving patient. Pre-admission, admission, and post admission procedures and expectations were discussed in detail. All questions answered, all appropriate consents will be signed at the Hospital. Admission is planned for today. Expectant management.

## 2020-06-28 LAB
BASOPHILS # BLD AUTO: 0.03 X10(3) UL (ref 0–0.2)
BASOPHILS NFR BLD AUTO: 0.2 %
DEPRECATED RDW RBC AUTO: 47.4 FL (ref 35.1–46.3)
EOSINOPHIL # BLD AUTO: 0.05 X10(3) UL (ref 0–0.7)
EOSINOPHIL NFR BLD AUTO: 0.4 %
ERYTHROCYTE [DISTWIDTH] IN BLOOD BY AUTOMATED COUNT: 13.9 % (ref 11–15)
HCT VFR BLD AUTO: 23.2 % (ref 35–48)
HGB BLD-MCNC: 7.6 G/DL (ref 12–16)
IMM GRANULOCYTES # BLD AUTO: 0.04 X10(3) UL (ref 0–1)
IMM GRANULOCYTES NFR BLD: 0.3 %
LYMPHOCYTES # BLD AUTO: 2.58 X10(3) UL (ref 1–4)
LYMPHOCYTES NFR BLD AUTO: 20.6 %
MCH RBC QN AUTO: 30.3 PG (ref 26–34)
MCHC RBC AUTO-ENTMCNC: 32.8 G/DL (ref 31–37)
MCV RBC AUTO: 92.4 FL (ref 80–100)
MONOCYTES # BLD AUTO: 1.03 X10(3) UL (ref 0.1–1)
MONOCYTES NFR BLD AUTO: 8.2 %
NEUTROPHILS # BLD AUTO: 8.81 X10 (3) UL (ref 1.5–7.7)
NEUTROPHILS # BLD AUTO: 8.81 X10(3) UL (ref 1.5–7.7)
NEUTROPHILS NFR BLD AUTO: 70.3 %
PLATELET # BLD AUTO: 127 10(3)UL (ref 150–450)
RBC # BLD AUTO: 2.51 X10(6)UL (ref 3.8–5.3)
WBC # BLD AUTO: 12.5 X10(3) UL (ref 4–11)

## 2020-06-28 RX ORDER — MELATONIN
325
Status: DISCONTINUED | OUTPATIENT
Start: 2020-06-28 | End: 2020-06-29

## 2020-06-28 NOTE — PROGRESS NOTES
[facility]  Post-Partum Progress Note    Corina Mackay Patient Status:  Inpatient    4/15/1985 MRN [id number]   [facility] 2SW-J Attending Reynaldo Chamorro MD   Hosp Day # 1 PCP Claudia Umana DO     SUBJECTIVE:    Postpartum Day

## 2020-06-28 NOTE — PROGRESS NOTES
NURSING ADMISSION NOTE      Patient admitted via Wheelchair  Oriented to room. Safety precautions initiated. Bed in low position. Call light in reach.  ID bands checked, HUG and KISS in place

## 2020-06-29 VITALS
BODY MASS INDEX: 32.78 KG/M2 | DIASTOLIC BLOOD PRESSURE: 65 MMHG | HEIGHT: 63 IN | OXYGEN SATURATION: 100 % | HEART RATE: 93 BPM | WEIGHT: 185 LBS | SYSTOLIC BLOOD PRESSURE: 99 MMHG | RESPIRATION RATE: 20 BRPM | TEMPERATURE: 98 F

## 2020-06-29 LAB
BASOPHILS # BLD AUTO: 0.04 X10(3) UL (ref 0–0.2)
BASOPHILS NFR BLD AUTO: 0.4 %
DEPRECATED RDW RBC AUTO: 48.3 FL (ref 35.1–46.3)
EOSINOPHIL # BLD AUTO: 0.11 X10(3) UL (ref 0–0.7)
EOSINOPHIL NFR BLD AUTO: 1.1 %
ERYTHROCYTE [DISTWIDTH] IN BLOOD BY AUTOMATED COUNT: 14.1 % (ref 11–15)
HCT VFR BLD AUTO: 22.1 % (ref 35–48)
HGB BLD-MCNC: 7.2 G/DL (ref 12–16)
IMM GRANULOCYTES # BLD AUTO: 0.09 X10(3) UL (ref 0–1)
IMM GRANULOCYTES NFR BLD: 0.9 %
LYMPHOCYTES # BLD AUTO: 2.5 X10(3) UL (ref 1–4)
LYMPHOCYTES NFR BLD AUTO: 24.3 %
MCH RBC QN AUTO: 30.9 PG (ref 26–34)
MCHC RBC AUTO-ENTMCNC: 32.6 G/DL (ref 31–37)
MCV RBC AUTO: 94.8 FL (ref 80–100)
MONOCYTES # BLD AUTO: 0.73 X10(3) UL (ref 0.1–1)
MONOCYTES NFR BLD AUTO: 7.1 %
NEUTROPHILS # BLD AUTO: 6.8 X10 (3) UL (ref 1.5–7.7)
NEUTROPHILS # BLD AUTO: 6.8 X10(3) UL (ref 1.5–7.7)
NEUTROPHILS NFR BLD AUTO: 66.2 %
PLATELET # BLD AUTO: 148 10(3)UL (ref 150–450)
RBC # BLD AUTO: 2.33 X10(6)UL (ref 3.8–5.3)
WBC # BLD AUTO: 10.3 X10(3) UL (ref 4–11)

## 2020-06-29 RX ORDER — IBUPROFEN 600 MG/1
600 TABLET ORAL EVERY 6 HOURS PRN
Qty: 40 TABLET | Refills: 0 | Status: SHIPPED | OUTPATIENT
Start: 2020-06-29 | End: 2020-08-17

## 2020-06-29 RX ORDER — PSEUDOEPHEDRINE HCL 30 MG
100 TABLET ORAL 2 TIMES DAILY PRN
Qty: 30 CAPSULE | Refills: 0 | Status: SHIPPED | OUTPATIENT
Start: 2020-06-29 | End: 2020-08-17

## 2020-06-29 NOTE — PROGRESS NOTES
Pt ate a sandwich and had juice was up walking in room and stated she felt better, but was tired. Planned to go home and rest and stated her mother was coming over to help. Reviewed warning signs to call doctor. Pt stated she was ready to go home.  Pt and

## 2020-06-29 NOTE — PAYOR COMM NOTE
--------------  ADMISSION REVIEW     Payor: Lisa URBINA  Subscriber #:  [id number]  Authorization Number: [id number]    Admit date: 6/27/20  Admit time: 8148       Admitting Physician: Reynaldo Chamorro MD  Attending Physician:  Reynaldo Chamorro, sign),  Trace edema       FHT:  130 BPM, Moderate variability, accelerations are present, no decelerations. Reassuring i.e. category 1. Spontaneous contractions every 1 minutes lasting 60 seconds. Lab Review  Recent Labs   Lab 06/27/20  0934   RBC 4. None  Antibiotics received during labor?:  Yes  Antibiotics (enter # doses in comment):  ampicillin  Rupture date/time:  6/27/2020 0939     Rupture type:  AROM  Fluid color:  Meconium  Induction:  None  Augmentation:  AROM  Indications for augmentation:  I Absent Weak cry; hypoventilation Good, crying                1 Minute:   5 Minute:   10 Minute:   15 Minute:   20 Minute:     Skin color: 0  1          Heart rate: 2  2          Reflex irritablity: 2  2          Muscle tone: 2  2          Respiratory effor perineal laceration repaired in layers. The vaginal portion was approximated with a running locking  2.0 rapide vicryl. A crown stitch was placed and tied. The perineum was approximated with one 2.0 rapide vicryl suture. The skin was approximated with 2.

## 2020-06-29 NOTE — PROGRESS NOTES
[facility]  Obstetrics and Gynecology    OB/GYN: Postpartum Progress Note     SUBJECTIVE:  Patient is a 38year old  female who is s/p . She is PPD# 2. Doing well. Denies fever, chills, N, V, chest pain and SOB.  Bleeding has been sta

## 2020-06-29 NOTE — PROGRESS NOTES
06/29/20 1030   Provider Notification   Reason for Communication Review case  (Started venofer and pt received 1/2 dose and unable to tolerate, reviewed cbc results,)   Provider Name   (Dr. Geovani Styles)   Method of Communication Call   Response   (restart IV

## 2020-06-29 NOTE — PROGRESS NOTES
Reviewed discharge instructions and resources available after discharge home, questions answered. Verbalized understanding.  Pt stated she feels confident caring for self and    ID bands matched with pt and infant, Hugs and Kisses security tags remov

## 2020-06-29 NOTE — DISCHARGE SUMMARY
[facility]  Discharge Summary    Essie Olguin Patient Status:  inpatient    4/15/1985 MRN [id number]   Location 6825/4314-Y Attending EMG [address] # 2 PCP Glenny Farr DO     Date of Admission: 2020    Date of Discharge: 20 Oral Tab  Take 1 tablet by mouth daily. Follow up Visits:  Follow-up with [facility] in 6 weeks      Marlon Fothergill, MD   [facility] - [facility]

## 2020-06-29 NOTE — PROGRESS NOTES
Pt ready for discharge, stated that she walk, however changed her mind and stated she felt weak. When talking with pt she had not eaten lunch. [place] and juice provided and encouraged pt to eat and rest until she felt stronger.

## 2020-07-06 ENCOUNTER — TELEPHONE (OUTPATIENT)
Dept: OBGYN UNIT | Facility: HOSPITAL | Age: 35
End: 2020-07-06

## 2020-07-09 ENCOUNTER — TELEPHONE (OUTPATIENT)
Dept: OBGYN CLINIC | Facility: CLINIC | Age: 35
End: 2020-07-09

## 2020-08-08 ENCOUNTER — MED REC SCAN ONLY (OUTPATIENT)
Dept: OBGYN CLINIC | Facility: CLINIC | Age: 35
End: 2020-08-08

## 2020-08-12 ENCOUNTER — TELEPHONE (OUTPATIENT)
Dept: OBGYN CLINIC | Facility: CLINIC | Age: 35
End: 2020-08-12

## 2020-08-12 NOTE — TELEPHONE ENCOUNTER
Patient returned call. Questions answered about post partum bleeding and activity level. Patient states understanding and has no further questions or concerns at this time.

## 2020-08-17 ENCOUNTER — POSTPARTUM (OUTPATIENT)
Dept: OBGYN CLINIC | Facility: CLINIC | Age: 35
End: 2020-08-17
Payer: COMMERCIAL

## 2020-08-17 VITALS
DIASTOLIC BLOOD PRESSURE: 64 MMHG | SYSTOLIC BLOOD PRESSURE: 100 MMHG | TEMPERATURE: 98 F | BODY MASS INDEX: 28 KG/M2 | WEIGHT: 155.81 LBS

## 2020-08-17 PROBLEM — O24.410 DIET CONTROLLED GESTATIONAL DIABETES MELLITUS (GDM) IN THIRD TRIMESTER (HCC): Status: RESOLVED | Noted: 2020-03-30 | Resolved: 2020-08-17

## 2020-08-17 PROBLEM — O09.529 ANTEPARTUM MULTIGRAVIDA OF ADVANCED MATERNAL AGE (HCC): Status: RESOLVED | Noted: 2019-12-05 | Resolved: 2020-08-17

## 2020-08-17 PROBLEM — O09.529 ANTEPARTUM MULTIGRAVIDA OF ADVANCED MATERNAL AGE: Status: RESOLVED | Noted: 2019-12-05 | Resolved: 2020-08-17

## 2020-08-17 PROBLEM — O24.410 DIET CONTROLLED GESTATIONAL DIABETES MELLITUS (GDM) IN THIRD TRIMESTER: Status: RESOLVED | Noted: 2020-03-30 | Resolved: 2020-08-17

## 2020-08-17 PROCEDURE — 3078F DIAST BP <80 MM HG: CPT | Performed by: OBSTETRICS & GYNECOLOGY

## 2020-08-17 PROCEDURE — 3074F SYST BP LT 130 MM HG: CPT | Performed by: OBSTETRICS & GYNECOLOGY

## 2020-08-17 RX ORDER — MELATONIN
325
COMMUNITY
End: 2021-04-05

## 2020-08-17 NOTE — PROGRESS NOTES
GYNE postpartum note     S: patient is a 44yo  who presents today for post partum visit. She underwent svvd on 06/27/2020. She is doing well, still breast feeding . Has no complaints.  Bleeding is minimal now   Denies any depressed mood, anxiety, no SI/HI

## 2020-09-22 ENCOUNTER — TELEPHONE (OUTPATIENT)
Dept: OBGYN CLINIC | Facility: CLINIC | Age: 35
End: 2020-09-22

## 2020-09-22 NOTE — TELEPHONE ENCOUNTER
37 y/o called regarding irregular bleeding. She had  on 2020. She had PP appointment and all was normal. Patient states she has what she thinks is a period on . She bled for 8 days. She started spotting and bleeding again yesterday.  Denies a

## 2020-09-22 NOTE — TELEPHONE ENCOUNTER
Pt calling states she has post partum questions and would like to speak with nurse.  She saw Dr. Gerry Garcia for [facility] in [facility].

## 2021-01-06 ENCOUNTER — TELEMEDICINE (OUTPATIENT)
Dept: TELEHEALTH | Age: 36
End: 2021-01-06

## 2021-01-06 DIAGNOSIS — R19.7 DIARRHEA, UNSPECIFIED TYPE: Primary | ICD-10-CM

## 2021-01-06 PROCEDURE — 99213 OFFICE O/P EST LOW 20 MIN: CPT | Performed by: NURSE PRACTITIONER

## 2021-01-06 NOTE — PROGRESS NOTES
Chin Alarcon is a 38year old female. HPI:   Patient presents with:  Diarrhea    Encounter was conducted by video visit. Encounter lasted approximately 17 minutes. Patient reports 3 day history of diarrhea.  Yesterday her stools were watery, but today Discussed hydration and diet for diarrhea. She will follow up if she decided to be Covid tested. Patient Instructions     Treating Diarrhea    Diarrhea happens when you have loose, watery, or frequent bowel movements.  It is a common problem with many c © 6114-2093 [employer] [address]. 1407 [facility] [place], [address] [place]. All rights reserved. This information is not intended as a substitute for professional medical care. Always follow your healthcare professional's instructions.         Diet fo Daphnie last reviewed this educational content on 9/1/2019  © 4573-0912 [employer] [address]. 1407 [facility] [place], [facility] [place]. All rights reserved. This information is not intended as a substitute for professional medical care.  Always follo

## 2021-01-06 NOTE — PATIENT INSTRUCTIONS
Treating Diarrhea    Diarrhea happens when you have loose, watery, or frequent bowel movements. It is a common problem with many causes. Most cases of diarrhea clear up on their own. But certain cases may need treatment.  Be sure to see your healthcare pr © 9270-1586 [employer] [address]. 1407 [facility] [place], [address]. All rights reserved. This information is not intended as a substitute for professional medical care. Always follow your healthcare professional's instructions.         Diet fo Daphnie last reviewed this educational content on 9/1/2019  © 5538-1572 [employer] [address]. 1407 [facility], [facility]. All rights reserved. This information is not intended as a substitute for professional medical care.  Always follo

## 2021-04-01 ENCOUNTER — TELEPHONE (OUTPATIENT)
Dept: FAMILY MEDICINE CLINIC | Facility: CLINIC | Age: 36
End: 2021-04-01

## 2021-04-01 NOTE — TELEPHONE ENCOUNTER
- Pt scheduled appointment thru mychart forChest/stomach pain and skin irritation. Please advise if patient can be seen in the office.     Future Appointments   Date Time Provider Marisol Lopez   4/5/2021  4:15 PM Marta Farr, DO EM[facility] 2

## 2021-04-01 NOTE — TELEPHONE ENCOUNTER
Recd this appointment notification via [employer] for the following reason:  Chest/stomach pain and skin irritation    Ok to see in office?

## 2021-04-01 NOTE — TELEPHONE ENCOUNTER
Pt states that since she delivered her daughter 9 months ago she has been experiencing a sensation in her throat/chest during or after eating, states it feels like heartburn.  She did experience heartburn while she was pregnant but was told it would likely

## 2021-04-05 ENCOUNTER — OFFICE VISIT (OUTPATIENT)
Dept: FAMILY MEDICINE CLINIC | Facility: CLINIC | Age: 36
End: 2021-04-05
Payer: COMMERCIAL

## 2021-04-05 VITALS
TEMPERATURE: 98 F | HEIGHT: 63 IN | RESPIRATION RATE: 16 BRPM | WEIGHT: 163 LBS | HEART RATE: 88 BPM | SYSTOLIC BLOOD PRESSURE: 124 MMHG | BODY MASS INDEX: 28.88 KG/M2 | DIASTOLIC BLOOD PRESSURE: 70 MMHG | OXYGEN SATURATION: 99 %

## 2021-04-05 DIAGNOSIS — Z28.21 REFUSED INFLUENZA VACCINE: ICD-10-CM

## 2021-04-05 DIAGNOSIS — M25.519 ARTHRALGIA OF SHOULDER, UNSPECIFIED LATERALITY: ICD-10-CM

## 2021-04-05 DIAGNOSIS — R21 RASH: Primary | ICD-10-CM

## 2021-04-05 DIAGNOSIS — R12 HEARTBURN: ICD-10-CM

## 2021-04-05 DIAGNOSIS — O24.419 GESTATIONAL DIABETES MELLITUS (GDM), ANTEPARTUM, GESTATIONAL DIABETES METHOD OF CONTROL UNSPECIFIED: ICD-10-CM

## 2021-04-05 DIAGNOSIS — Z00.00 LABORATORY TESTS ORDERED AS PART OF A COMPLETE PHYSICAL EXAM (CPE): ICD-10-CM

## 2021-04-05 DIAGNOSIS — E55.9 HYPOVITAMINOSIS D: ICD-10-CM

## 2021-04-05 PROCEDURE — 99215 OFFICE O/P EST HI 40 MIN: CPT | Performed by: FAMILY MEDICINE

## 2021-04-05 PROCEDURE — 3074F SYST BP LT 130 MM HG: CPT | Performed by: FAMILY MEDICINE

## 2021-04-05 PROCEDURE — 3008F BODY MASS INDEX DOCD: CPT | Performed by: FAMILY MEDICINE

## 2021-04-05 PROCEDURE — 3078F DIAST BP <80 MM HG: CPT | Performed by: FAMILY MEDICINE

## 2021-04-05 RX ORDER — OMEPRAZOLE 40 MG/1
40 CAPSULE, DELAYED RELEASE ORAL DAILY
Qty: 90 CAPSULE | Refills: 0 | Status: SHIPPED | OUTPATIENT
Start: 2021-04-05 | End: 2022-03-31

## 2021-04-05 NOTE — PROGRESS NOTES
HPI/Subjective:   Corina Mackay is a 38year old female who presents for Heartburn (Since she had her daughter she has had bad acid reflux and sometimes will come with pressure.), Joint Pain (Joint stiffness-ongoing since she had her daughter), and Rash year). The problem occurs intermittently. The problem has been unchanged. Associated symptoms include arthralgias and a rash.  Pertinent negatives include no abdominal pain, anorexia, change in bowel habit, chest pain, chills, congestion, coughing, diaphore m)   Wt 163 lb (73.9 kg)   LMP 03/08/2021   SpO2 99%   BMI 28.87 kg/m²  Estimated body mass index is 28.87 kg/m² as calculated from the following:    Height as of this encounter: 5' 3\" (1.6 m). Weight as of this encounter: 163 lb (73.9 kg).   Physical E Capillary refill takes less than 2 seconds. Findings: Rash present. Rash is macular. Neurological:      Mental Status: She is alert and oriented to person, place, and time. Psychiatric:         Mood and Affect: Mood is depressed.          Kelby Pearson Hx of gdm will check a1c to r/o gdm   - VITAMIN D, 25-HYDROXY; Future  - HEMOGLOBIN A1C; Future    6. Hypovitaminosis D    - VITAMIN D, 25-HYDROXY; Future    7. Joint pains  Diffuse body aches. Suspect she has postpartum related joint aches.  Advised to i

## 2021-04-07 ENCOUNTER — LAB ENCOUNTER (OUTPATIENT)
Dept: LAB | Age: 36
End: 2021-04-07
Attending: FAMILY MEDICINE
Payer: COMMERCIAL

## 2021-04-07 DIAGNOSIS — Z00.00 LABORATORY TESTS ORDERED AS PART OF A COMPLETE PHYSICAL EXAM (CPE): ICD-10-CM

## 2021-04-07 DIAGNOSIS — E55.9 HYPOVITAMINOSIS D: ICD-10-CM

## 2021-04-07 DIAGNOSIS — O24.419 GESTATIONAL DIABETES MELLITUS (GDM), ANTEPARTUM, GESTATIONAL DIABETES METHOD OF CONTROL UNSPECIFIED: ICD-10-CM

## 2021-04-07 PROCEDURE — 82306 VITAMIN D 25 HYDROXY: CPT | Performed by: FAMILY MEDICINE

## 2021-04-07 PROCEDURE — 80050 GENERAL HEALTH PANEL: CPT | Performed by: FAMILY MEDICINE

## 2021-04-07 PROCEDURE — 80061 LIPID PANEL: CPT | Performed by: FAMILY MEDICINE

## 2021-04-07 PROCEDURE — 36415 COLL VENOUS BLD VENIPUNCTURE: CPT | Performed by: FAMILY MEDICINE

## 2021-04-07 PROCEDURE — 83036 HEMOGLOBIN GLYCOSYLATED A1C: CPT | Performed by: FAMILY MEDICINE

## 2021-04-07 PROCEDURE — 83550 IRON BINDING TEST: CPT | Performed by: FAMILY MEDICINE

## 2021-04-07 PROCEDURE — 83540 ASSAY OF IRON: CPT | Performed by: FAMILY MEDICINE

## 2021-04-08 ENCOUNTER — TELEPHONE (OUTPATIENT)
Dept: FAMILY MEDICINE CLINIC | Facility: CLINIC | Age: 36
End: 2021-04-08

## 2021-04-08 NOTE — TELEPHONE ENCOUNTER
Informed pt of the change in healthcare information laws and now all results are immediately results to pts and pts may see results before their doctor.  Asked pt to please allow up to 3 business days for Dr. Julianna Porter to review labs and provide recommnedati

## 2021-04-08 NOTE — TELEPHONE ENCOUNTER
Pt would like to speak to a nurse regarding the results of [facility]

## 2021-04-12 ENCOUNTER — PATIENT MESSAGE (OUTPATIENT)
Dept: FAMILY MEDICINE CLINIC | Facility: CLINIC | Age: 36
End: 2021-04-12

## 2021-04-13 NOTE — TELEPHONE ENCOUNTER
From: Samia Marina  To: Camilla Rhodes DO  Sent: 4/12/2021 6:02 PM CDT  Subject: Test Results Lydia Adair,    I wanted to get in touch with you about my blood work results.  It seems like my vitamin D is on the lower side along with

## 2021-04-15 RX ORDER — ERGOCALCIFEROL 1.25 MG/1
50000 CAPSULE ORAL WEEKLY
Qty: 12 CAPSULE | Refills: 0 | Status: SHIPPED | OUTPATIENT
Start: 2021-04-15 | End: 2021-07-14

## 2021-04-16 ENCOUNTER — OFFICE VISIT (OUTPATIENT)
Dept: FAMILY MEDICINE CLINIC | Facility: CLINIC | Age: 36
End: 2021-04-16
Payer: COMMERCIAL

## 2021-04-16 VITALS
TEMPERATURE: 98 F | SYSTOLIC BLOOD PRESSURE: 122 MMHG | RESPIRATION RATE: 16 BRPM | WEIGHT: 164 LBS | DIASTOLIC BLOOD PRESSURE: 70 MMHG | OXYGEN SATURATION: 99 % | HEIGHT: 63 IN | BODY MASS INDEX: 29.06 KG/M2 | HEART RATE: 74 BPM

## 2021-04-16 DIAGNOSIS — Z00.00 WELL ADULT EXAM: Primary | ICD-10-CM

## 2021-04-16 DIAGNOSIS — E55.9 HYPOVITAMINOSIS D: ICD-10-CM

## 2021-04-16 PROBLEM — Z34.90 NORMAL PREGNANCY: Status: RESOLVED | Noted: 2020-06-27 | Resolved: 2021-04-16

## 2021-04-16 PROBLEM — Z34.90 NORMAL PREGNANCY (HCC): Status: RESOLVED | Noted: 2020-06-27 | Resolved: 2021-04-16

## 2021-04-16 PROBLEM — Z34.90 PREGNANCY (HCC): Status: RESOLVED | Noted: 2020-06-10 | Resolved: 2021-04-16

## 2021-04-16 PROBLEM — Z34.90 PREGNANCY: Status: RESOLVED | Noted: 2020-06-10 | Resolved: 2021-04-16

## 2021-04-16 PROCEDURE — 3074F SYST BP LT 130 MM HG: CPT | Performed by: FAMILY MEDICINE

## 2021-04-16 PROCEDURE — 3008F BODY MASS INDEX DOCD: CPT | Performed by: FAMILY MEDICINE

## 2021-04-16 PROCEDURE — 3078F DIAST BP <80 MM HG: CPT | Performed by: FAMILY MEDICINE

## 2021-04-16 PROCEDURE — 99395 PREV VISIT EST AGE 18-39: CPT | Performed by: FAMILY MEDICINE

## 2021-04-16 NOTE — PROGRESS NOTES
Patient presents with:  Physical      HPI:  68-year-old female with a medical history of low vitamin D level presenting today for her annual physical exam.  She does not have any acute complaints today she does report feeling fatigued over the last year si bruise/bleed easily. Psychiatric/Behavioral: The patient is not nervous/anxious. No depression.     Patient Active Problem List:     Pregnancy resulting from in vitro fertilization, antepartum      Past Medical History:   Diagnosis Date   • Gestational di older 0.5 ml Prefilled syringe (32173)                          04/05/2021        Physical Exam  /70   Pulse 74   Temp 97.9 °F (36.6 °C) (Temporal)   Resp 16   Ht 5' 3\" (1.6 m)   Wt 164 lb (74.4 kg)   LMP 04/09/2021   SpO2 99%   BMI 29.05 kg/m²   Co control unspecified  Hx of gdm repeat A1c is 4.8 and excellent. Hypovitaminosis D  Patient started taking her vitamin D supplement weekly for 12 weeks. We will recheck in 12 weeks. Joint pains  Diffuse body aches.   Suspect this is a manifestation between ages 24 and 34 should have a Pap test every 3 years; women between ages 27 and 72 are advised to have a Pap test plus an HPV test every 5 years    Chlamydia Sexually active women ages 25 and younger, and women at increased risk for infection  Every (HPV)  All women in this age group up to age 32  3 doses; the second dose should be given 1 to 2 months after the first dose and the third dose given 6 months after the first dose    Influenza (flu) All women in this age group  Once a year   Measles, mumps self-exams are an option for women starting in their 25s.  But the U.S. Preventive Services Task Force (USPSTF) does not recommend CBE.    2 Those who are 25years old and not up-to-date on their childhood vaccines should get all appropriate catch-up vaccine

## 2021-07-13 ENCOUNTER — LAB ENCOUNTER (OUTPATIENT)
Dept: LAB | Age: 36
End: 2021-07-13
Attending: FAMILY MEDICINE
Payer: COMMERCIAL

## 2021-07-13 DIAGNOSIS — E55.9 HYPOVITAMINOSIS D: ICD-10-CM

## 2021-07-13 LAB — VIT D+METAB SERPL-MCNC: 35.7 NG/ML (ref 30–100)

## 2021-07-13 PROCEDURE — 82306 VITAMIN D 25 HYDROXY: CPT | Performed by: FAMILY MEDICINE

## 2021-09-07 ENCOUNTER — TELEPHONE (OUTPATIENT)
Dept: FAMILY MEDICINE CLINIC | Facility: CLINIC | Age: 36
End: 2021-09-07

## 2021-09-07 DIAGNOSIS — Z11.1 SCREENING-PULMONARY TB: Primary | ICD-10-CM

## 2021-09-07 NOTE — TELEPHONE ENCOUNTER
Patient states her new job is requiring a TB skin test, please enter order, already has an appt if OK.     Future Appointments   Date Time Provider Marisol Lopez   9/8/2021 10:00 AM EMG 20 NURSE EMG 20 EMG [address]

## 2021-09-08 ENCOUNTER — TELEPHONE (OUTPATIENT)
Dept: FAMILY MEDICINE CLINIC | Facility: CLINIC | Age: 36
End: 2021-09-08

## 2021-09-08 ENCOUNTER — NURSE ONLY (OUTPATIENT)
Dept: FAMILY MEDICINE CLINIC | Facility: CLINIC | Age: 36
End: 2021-09-08
Payer: COMMERCIAL

## 2021-09-08 DIAGNOSIS — Z11.1 SCREENING-PULMONARY TB: ICD-10-CM

## 2021-09-08 PROCEDURE — 86580 TB INTRADERMAL TEST: CPT | Performed by: FAMILY MEDICINE

## 2021-09-08 NOTE — TELEPHONE ENCOUNTER
See TE, pt had AWV 4/16/2021. Form placed in your bin for your review and signature. Pt has appointment Friday for TB read.    Future Appointments   Date Time Provider Marisol Lopez   9/10/2021 10:30 AM EMG 20 NURSE EMG 20 EMG [address]

## 2021-09-08 NOTE — TELEPHONE ENCOUNTER
Pt in office for TB test, pt has a Physician's statement of Good Health that she needs signed by Dr. Arian Bowser.

## 2021-09-10 ENCOUNTER — NURSE ONLY (OUTPATIENT)
Dept: FAMILY MEDICINE CLINIC | Facility: CLINIC | Age: 36
End: 2021-09-10
Payer: COMMERCIAL

## 2021-09-10 LAB — INDURATION (): 0 MM (ref 0–11)

## 2021-09-10 NOTE — PROGRESS NOTES
Dave Johnson presents today for nurse visit. TB test read on left forearm was negative (0 mm induration). Provided patient with paperwork for her employment. Left office in stable condition.

## 2021-12-07 ENCOUNTER — LABORATORY ENCOUNTER (OUTPATIENT)
Dept: LAB | Age: 36
End: 2021-12-07
Attending: FAMILY MEDICINE
Payer: COMMERCIAL

## 2021-12-07 ENCOUNTER — OFFICE VISIT (OUTPATIENT)
Dept: FAMILY MEDICINE CLINIC | Facility: CLINIC | Age: 36
End: 2021-12-07
Payer: COMMERCIAL

## 2021-12-07 VITALS
BODY MASS INDEX: 28.7 KG/M2 | OXYGEN SATURATION: 98 % | SYSTOLIC BLOOD PRESSURE: 118 MMHG | TEMPERATURE: 98 F | DIASTOLIC BLOOD PRESSURE: 74 MMHG | RESPIRATION RATE: 16 BRPM | WEIGHT: 162 LBS | HEIGHT: 63 IN | HEART RATE: 80 BPM

## 2021-12-07 DIAGNOSIS — E55.9 HYPOVITAMINOSIS D: ICD-10-CM

## 2021-12-07 DIAGNOSIS — K21.00 GASTROESOPHAGEAL REFLUX DISEASE WITH ESOPHAGITIS WITHOUT HEMORRHAGE: ICD-10-CM

## 2021-12-07 DIAGNOSIS — Z23 NEED FOR VACCINATION: ICD-10-CM

## 2021-12-07 DIAGNOSIS — R12 HEARTBURN: ICD-10-CM

## 2021-12-07 DIAGNOSIS — K21.00 GASTROESOPHAGEAL REFLUX DISEASE WITH ESOPHAGITIS WITHOUT HEMORRHAGE: Primary | ICD-10-CM

## 2021-12-07 DIAGNOSIS — R10.13 EPIGASTRIC PAIN: ICD-10-CM

## 2021-12-07 PROCEDURE — 3074F SYST BP LT 130 MM HG: CPT | Performed by: FAMILY MEDICINE

## 2021-12-07 PROCEDURE — 90471 IMMUNIZATION ADMIN: CPT | Performed by: FAMILY MEDICINE

## 2021-12-07 PROCEDURE — 3078F DIAST BP <80 MM HG: CPT | Performed by: FAMILY MEDICINE

## 2021-12-07 PROCEDURE — 99213 OFFICE O/P EST LOW 20 MIN: CPT | Performed by: FAMILY MEDICINE

## 2021-12-07 PROCEDURE — 3008F BODY MASS INDEX DOCD: CPT | Performed by: FAMILY MEDICINE

## 2021-12-07 PROCEDURE — 82306 VITAMIN D 25 HYDROXY: CPT | Performed by: FAMILY MEDICINE

## 2021-12-07 PROCEDURE — 90686 IIV4 VACC NO PRSV 0.5 ML IM: CPT | Performed by: FAMILY MEDICINE

## 2021-12-07 PROCEDURE — 83013 H PYLORI (C-13) BREATH: CPT | Performed by: FAMILY MEDICINE

## 2021-12-07 NOTE — PROGRESS NOTES
Note to patient: The [facility] makes medical notes like these available to patients in the interest of transparency. However, be advised this is a medical document. It is intended as peer to peer communication.  It is written in medical languag fertilization, antepartum      Past Medical History:   Diagnosis Date   • Gestational diabetes      Past Surgical History:   Procedure Laterality Date   • CHOLECYSTECTOMY       Family History   Problem Relation Age of Onset   • Diabetes Father         Type distress. HEENT:  Normocephalic and atraumatic. Eyes: Conjunctivae   normal. PERRLA. No scleral icterus. Neck: Normal range of motion. Neck supple. No mass and no thyromegaly present.    Cardiovascular: Normal rate, regular rhythm and intact distal p PYLORI BREATH TEST, ADULT (>17);  Future    Orders Placed This Encounter      Vitamin D [E]      Helicobacter Pylori Breath Test, Adult      Flulaval 6 months and older, Quadrivalent, Preservative Free [67789]      Immunization Admin Counseling, 1st [facility]

## 2022-02-24 RX ORDER — OMEPRAZOLE 40 MG/1
40 CAPSULE, DELAYED RELEASE ORAL DAILY
Qty: 90 CAPSULE | Refills: 0 | Status: SHIPPED | OUTPATIENT
Start: 2022-02-24 | End: 2023-02-19

## 2022-05-02 ENCOUNTER — OFFICE VISIT (OUTPATIENT)
Dept: OBGYN CLINIC | Facility: CLINIC | Age: 37
End: 2022-05-02
Payer: COMMERCIAL

## 2022-05-02 VITALS
BODY MASS INDEX: 28.26 KG/M2 | WEIGHT: 159.5 LBS | HEART RATE: 71 BPM | HEIGHT: 63 IN | DIASTOLIC BLOOD PRESSURE: 66 MMHG | SYSTOLIC BLOOD PRESSURE: 102 MMHG

## 2022-05-02 DIAGNOSIS — Z12.4 SCREENING FOR MALIGNANT NEOPLASM OF CERVIX: ICD-10-CM

## 2022-05-02 DIAGNOSIS — N92.6 IRREGULAR MENSES: ICD-10-CM

## 2022-05-02 DIAGNOSIS — Z01.419 WELL FEMALE EXAM WITH ROUTINE GYNECOLOGICAL EXAM: Primary | ICD-10-CM

## 2022-05-02 PROCEDURE — 88175 CYTOPATH C/V AUTO FLUID REDO: CPT | Performed by: OBSTETRICS & GYNECOLOGY

## 2022-05-02 NOTE — PROGRESS NOTES
Annual  Menses regular, no contraception (infertility)  Has midcycle bleeding, some dysmenorrhea  She has had all these symptoms for years, but seems like they are worse  Just wanted to make sure there was nothing wrong  Girls are 2 and 5, doing well    ROS: No Cardiac, Respiratory, GI,  or Neurological symptoms.     PE:  GENERAL: well developed, well nourished, in no apparent distress alert oriented x 3  SKIN: no rashes, no suspicious lesions  NECK: supple; no thyroidmegaly, no adenopathy  LUNGS: clear  COR: regular  BREAST: firm nontender, no palpable masses or LN's, no nipple discharge, no skin retraction, no axillary adenopathy,   ABDOMEN: Soft, non distended; non tender, no masses  GYNE/: External Genitalia: Normal      Vagina: normal      Uterus: mid, mobile, non tender, Normal size     Cervix: NL, no lesions     Adnexa: non tender, no masses,   EXTREMITIES:  non tender without edema or DVT  LYMPH NODES:negative    A/P: Normal exam  PAP done  Normal GYN exam reassured  Check TSH  Mammogram age 34

## 2022-05-21 DIAGNOSIS — Z00.00 LABORATORY TESTS ORDERED AS PART OF A COMPLETE PHYSICAL EXAM (CPE): ICD-10-CM

## 2022-05-21 DIAGNOSIS — R12 HEARTBURN: ICD-10-CM

## 2022-05-23 RX ORDER — OMEPRAZOLE 40 MG/1
CAPSULE, DELAYED RELEASE ORAL
Qty: 90 CAPSULE | Refills: 0 | Status: SHIPPED | OUTPATIENT
Start: 2022-05-23

## 2022-06-21 LAB — AMB EXT COVID-19 RESULT: DETECTED

## 2022-06-27 ENCOUNTER — TELEPHONE (OUTPATIENT)
Dept: FAMILY MEDICINE CLINIC | Facility: CLINIC | Age: 37
End: 2022-06-27

## 2022-06-27 NOTE — TELEPHONE ENCOUNTER
Discussed CDC guidelines after testing positive for Covid on home test on 6/21/22 and again 6/28/22. Pt states today is one of her children's birthday but her children have been with her parents since last Tuesday. Pt states she \"thinks she will wait until after day 10\" for her children to come home as all of their \"quarantines would be over at that point. \" Pt states she is having a birthday party on Saturday, advised pt to review guidelines and make best decision for her and her guests, this writer informed pt that she will need to make that decision.

## 2022-08-08 ENCOUNTER — TELEPHONE (OUTPATIENT)
Dept: OBGYN CLINIC | Facility: CLINIC | Age: 37
End: 2022-08-08

## 2022-08-08 RX ORDER — FLUCONAZOLE 150 MG/1
TABLET ORAL
Qty: 2 TABLET | Refills: 0 | Status: SHIPPED | OUTPATIENT
Start: 2022-08-08

## 2022-08-08 NOTE — TELEPHONE ENCOUNTER
Patient calling with c/o light bleeding, and thick discharge, with some vaginal irritation. s/s consistent with yeast infection. Per protocol, Diflucan 150 mg tablet po now, may repeat in 72 hours if symptoms persist. To call office if symptoms remain after 2 doses. #2, no refill. Prescription sent to pharmacy on file after verifying correct location and allergies with patient. Advised patient to call with new or worsening symptoms or symptoms that persist after two doses. She states understanding.

## 2022-09-02 ENCOUNTER — LAB ENCOUNTER (OUTPATIENT)
Dept: LAB | Age: 37
End: 2022-09-02
Attending: OBSTETRICS & GYNECOLOGY
Payer: COMMERCIAL

## 2022-09-02 DIAGNOSIS — N92.6 IRREGULAR MENSES: ICD-10-CM

## 2022-09-02 LAB — TSI SER-ACNC: 2 MIU/ML (ref 0.36–3.74)

## 2022-09-02 PROCEDURE — 36415 COLL VENOUS BLD VENIPUNCTURE: CPT

## 2022-09-02 PROCEDURE — 84443 ASSAY THYROID STIM HORMONE: CPT

## 2022-12-12 NOTE — PROGRESS NOTES
IRVIN  Doing well. Denies Vb, LOF, contractions. Positive fetal movement. Has been checking her sugars with her father's Glucometer. Reports have been ok. But did have to give it back.    She would like to assume GDM instead of having 3 hour, due to concern NOTIFICATION OF ADMISSION DISCHARGE   This is a Notification of Discharge from [facility]  Please be advised that this patient has been discharge from our facility  Below you will find the admission and discharge date and time including the patient’s disposition  UTILIZATION REVIEW CONTACT:  Dionicio Godfrey  Utilization   Network Utilization Review Department  Phone: [phone] x carefully listen to the prompts  All voicemails are confidential   Email: [email or web address]     ADMISSION INFORMATION  PRESENTATION DATE: 11/28/2022  3:27 PM  OBERVATION ADMISSION DATE:   INPATIENT ADMISSION DATE: 11/28/22  5:37 PM   DISCHARGE DATE: 12/8/2022  4:53 PM   DISPOSITION:Home/Self Care    IMPORTANT INFORMATION:  Send all requests for admission clinical reviews, approved or denied determinations and any other requests to dedicated fax number below belonging to the campus where the patient is receiving treatment   List of dedicated fax numbers:  [address] DENIALS (Administrative/Medical Necessity) [phone]   [address] (Maternity/NICU/Pediatrics) [phone]   [facility] [phone]   [place] [address] [phone]   [address] [phone]   [address] [facility] [phone]   [address] [place] [address] [phone]   [address] [place] [address] [phone]   [facility]  [phone] 405[facility] [phone]   412 [facility] 850 [facility] [phone]

## 2023-01-08 ENCOUNTER — TELEPHONE (OUTPATIENT)
Dept: FAMILY MEDICINE CLINIC | Facility: CLINIC | Age: 38
End: 2023-01-08

## 2023-01-08 NOTE — TELEPHONE ENCOUNTER
Spoke with patient who stated her urine has been orange the last 2 days. Admits to some nausea, no vomiting, mild abdominal pain and generalized achiness and mild back pain. Denies fever, chills, dysuria, urinary frequency, urgency or hematuria, or jaundice. States there is a family history of kidney and liver disease. No new vitamins. Had eaten some pink pickled turnips earlier in the week. Just complete Amoxil for a sinus infection. Asking if she needs to be seen by someone today. I advised the patient that the immediate care closed at 3:30 and her symptoms do not suggest a need to be seen in the ER right now. She should increase her water intake. If she develops fever, chills, persistent vomiting, worsening abdominal pain or jaundice, she should be seen in the ER. Otherwise, she should call the office tomorrow and make an appointment to be seen and have her urine tested. Patient voiced understanding of these instructions.

## 2023-01-28 ENCOUNTER — E-VISIT (OUTPATIENT)
Dept: TELEHEALTH | Age: 38
End: 2023-01-28

## 2023-01-28 DIAGNOSIS — J01.41 ACUTE RECURRENT PANSINUSITIS: Primary | ICD-10-CM

## 2023-01-28 RX ORDER — AMOXICILLIN AND CLAVULANATE POTASSIUM 875; 125 MG/1; MG/1
1 TABLET, FILM COATED ORAL 2 TIMES DAILY WITH MEALS
Qty: 20 TABLET | Refills: 0 | Status: SHIPPED | OUTPATIENT
Start: 2023-01-28 | End: 2023-02-07

## 2023-08-03 ENCOUNTER — APPOINTMENT (OUTPATIENT)
Dept: GENERAL RADIOLOGY | Age: 38
End: 2023-08-03
Attending: PHYSICIAN ASSISTANT
Payer: COMMERCIAL

## 2023-08-03 ENCOUNTER — HOSPITAL ENCOUNTER (EMERGENCY)
Age: 38
Discharge: HOME | End: 2023-08-03
Attending: EMERGENCY MEDICINE
Payer: COMMERCIAL

## 2023-08-03 VITALS
HEIGHT: 63 IN | BODY MASS INDEX: 28.88 KG/M2 | DIASTOLIC BLOOD PRESSURE: 87 MMHG | SYSTOLIC BLOOD PRESSURE: 112 MMHG | TEMPERATURE: 98 F | OXYGEN SATURATION: 100 % | RESPIRATION RATE: 16 BRPM | WEIGHT: 163 LBS | HEART RATE: 87 BPM

## 2023-08-03 DIAGNOSIS — S06.0X0A CONCUSSION WITHOUT LOSS OF CONSCIOUSNESS, INITIAL ENCOUNTER: ICD-10-CM
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
## 2023-08-03 PROCEDURE — 99284 EMERGENCY DEPT VISIT MOD MDM: CPT

## 2023-08-03 PROCEDURE — 71046 X-RAY EXAM CHEST 2 VIEWS: CPT | Performed by: PHYSICIAN ASSISTANT

## 2023-08-03 PROCEDURE — 96372 THER/PROPH/DIAG INJ SC/IM: CPT

## 2023-08-03 RX ORDER — KETOROLAC TROMETHAMINE 30 MG/ML
30 INJECTION, SOLUTION INTRAMUSCULAR; INTRAVENOUS ONCE
Status: COMPLETED | OUTPATIENT
Start: 2023-08-03 | End: 2023-08-03

## 2023-08-03 NOTE — DISCHARGE INSTRUCTIONS
Follow up with your primary doctor. If you have new, changing or worsening symptoms, please go directly to the ER.
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
## 2023-08-09 ENCOUNTER — OFFICE VISIT (OUTPATIENT)
Dept: FAMILY MEDICINE CLINIC | Facility: CLINIC | Age: 38
End: 2023-08-09

## 2023-08-09 VITALS
HEIGHT: 63 IN | RESPIRATION RATE: 16 BRPM | HEART RATE: 74 BPM | OXYGEN SATURATION: 98 % | BODY MASS INDEX: 29.59 KG/M2 | WEIGHT: 167 LBS | SYSTOLIC BLOOD PRESSURE: 120 MMHG | TEMPERATURE: 97 F | DIASTOLIC BLOOD PRESSURE: 87 MMHG

## 2023-08-09 DIAGNOSIS — M54.2 NECK PAIN: Primary | ICD-10-CM

## 2023-08-09 DIAGNOSIS — M54.41 LOW BACK PAIN DUE TO BILATERAL SCIATICA: ICD-10-CM

## 2023-08-09 DIAGNOSIS — M62.838 NECK MUSCLE SPASM: ICD-10-CM

## 2023-08-09 DIAGNOSIS — M62.830 BACK MUSCLE SPASM: ICD-10-CM

## 2023-08-09 DIAGNOSIS — M54.42 LOW BACK PAIN DUE TO BILATERAL SCIATICA: ICD-10-CM

## 2023-08-09 PROCEDURE — 99214 OFFICE O/P EST MOD 30 MIN: CPT | Performed by: FAMILY MEDICINE

## 2023-08-09 RX ORDER — ONDANSETRON 4 MG/1
4 TABLET, FILM COATED ORAL EVERY 8 HOURS PRN
Qty: 15 TABLET | Refills: 0 | Status: SHIPPED | OUTPATIENT
Start: 2023-08-09

## 2023-08-09 RX ORDER — BACLOFEN 10 MG/1
10 TABLET ORAL NIGHTLY
Qty: 30 TABLET | Refills: 0 | Status: SHIPPED | OUTPATIENT
Start: 2023-08-09 | End: 2023-09-08

## 2023-08-09 RX ORDER — METHYLPREDNISOLONE 4 MG/1
TABLET ORAL
Qty: 21 EACH | Refills: 0 | Status: SHIPPED | OUTPATIENT
Start: 2023-08-09

## 2023-11-14 ENCOUNTER — OFFICE VISIT (OUTPATIENT)
Dept: FAMILY MEDICINE CLINIC | Facility: CLINIC | Age: 38
End: 2023-11-14
Payer: COMMERCIAL

## 2023-11-14 VITALS
SYSTOLIC BLOOD PRESSURE: 110 MMHG | BODY MASS INDEX: 30.3 KG/M2 | HEIGHT: 63 IN | WEIGHT: 171 LBS | HEART RATE: 95 BPM | RESPIRATION RATE: 16 BRPM | DIASTOLIC BLOOD PRESSURE: 68 MMHG | TEMPERATURE: 97 F | OXYGEN SATURATION: 97 %

## 2023-11-14 DIAGNOSIS — Z86.32 HISTORY OF GESTATIONAL DIABETES: ICD-10-CM

## 2023-11-14 DIAGNOSIS — J02.9 SORE THROAT: ICD-10-CM

## 2023-11-14 DIAGNOSIS — R20.2 TINGLING IN EXTREMITIES: ICD-10-CM

## 2023-11-14 DIAGNOSIS — Z00.00 WELL ADULT EXAM: Primary | ICD-10-CM

## 2023-11-14 DIAGNOSIS — M54.2 CHRONIC NECK PAIN: ICD-10-CM

## 2023-11-14 DIAGNOSIS — H69.93 DYSFUNCTION OF BOTH EUSTACHIAN TUBES: ICD-10-CM

## 2023-11-14 DIAGNOSIS — M22.2X2 PATELLOFEMORAL ARTHRALGIA OF BOTH KNEES: ICD-10-CM

## 2023-11-14 DIAGNOSIS — E55.9 VITAMIN D DEFICIENCY: ICD-10-CM

## 2023-11-14 DIAGNOSIS — M22.2X1 PATELLOFEMORAL ARTHRALGIA OF BOTH KNEES: ICD-10-CM

## 2023-11-14 DIAGNOSIS — Z12.31 SCREENING MAMMOGRAM, ENCOUNTER FOR: ICD-10-CM

## 2023-11-14 DIAGNOSIS — B34.9 VIRAL SYNDROME: ICD-10-CM

## 2023-11-14 DIAGNOSIS — Z13.220 SCREENING CHOLESTEROL LEVEL: ICD-10-CM

## 2023-11-14 DIAGNOSIS — Z13.31 NEGATIVE DEPRESSION SCREENING: ICD-10-CM

## 2023-11-14 DIAGNOSIS — G89.29 CHRONIC NECK PAIN: ICD-10-CM

## 2023-11-14 DIAGNOSIS — Z51.81 MEDICATION MONITORING ENCOUNTER: ICD-10-CM

## 2023-11-14 DIAGNOSIS — G89.29 CHRONIC BILATERAL LOW BACK PAIN WITH BILATERAL SCIATICA: ICD-10-CM

## 2023-11-14 DIAGNOSIS — Z83.3 FAMILY HISTORY OF DIABETES MELLITUS: ICD-10-CM

## 2023-11-14 DIAGNOSIS — M54.42 CHRONIC BILATERAL LOW BACK PAIN WITH BILATERAL SCIATICA: ICD-10-CM

## 2023-11-14 DIAGNOSIS — M54.41 CHRONIC BILATERAL LOW BACK PAIN WITH BILATERAL SCIATICA: ICD-10-CM

## 2023-11-14 LAB
CONTROL LINE PRESENT WITH A CLEAR BACKGROUND (YES/NO): YES YES/NO
KIT LOT #: 7695 NUMERIC
STREP GRP A CUL-SCR: NEGATIVE

## 2023-11-14 PROCEDURE — 3078F DIAST BP <80 MM HG: CPT | Performed by: STUDENT IN AN ORGANIZED HEALTH CARE EDUCATION/TRAINING PROGRAM

## 2023-11-14 PROCEDURE — 99214 OFFICE O/P EST MOD 30 MIN: CPT | Performed by: STUDENT IN AN ORGANIZED HEALTH CARE EDUCATION/TRAINING PROGRAM

## 2023-11-14 PROCEDURE — 99396 PREV VISIT EST AGE 40-64: CPT | Performed by: STUDENT IN AN ORGANIZED HEALTH CARE EDUCATION/TRAINING PROGRAM

## 2023-11-14 PROCEDURE — 3074F SYST BP LT 130 MM HG: CPT | Performed by: STUDENT IN AN ORGANIZED HEALTH CARE EDUCATION/TRAINING PROGRAM

## 2023-11-14 PROCEDURE — 87081 CULTURE SCREEN ONLY: CPT | Performed by: STUDENT IN AN ORGANIZED HEALTH CARE EDUCATION/TRAINING PROGRAM

## 2023-11-14 PROCEDURE — 87880 STREP A ASSAY W/OPTIC: CPT | Performed by: STUDENT IN AN ORGANIZED HEALTH CARE EDUCATION/TRAINING PROGRAM

## 2023-11-14 PROCEDURE — 3008F BODY MASS INDEX DOCD: CPT | Performed by: STUDENT IN AN ORGANIZED HEALTH CARE EDUCATION/TRAINING PROGRAM

## 2023-11-24 ENCOUNTER — PATIENT MESSAGE (OUTPATIENT)
Dept: FAMILY MEDICINE CLINIC | Facility: CLINIC | Age: 38
End: 2023-11-24

## 2023-11-27 ENCOUNTER — TELEMEDICINE (OUTPATIENT)
Dept: FAMILY MEDICINE CLINIC | Facility: CLINIC | Age: 38
End: 2023-11-27
Payer: COMMERCIAL

## 2023-11-27 ENCOUNTER — TELEPHONE (OUTPATIENT)
Dept: FAMILY MEDICINE CLINIC | Facility: CLINIC | Age: 38
End: 2023-11-27

## 2023-11-27 DIAGNOSIS — J18.9 COMMUNITY ACQUIRED PNEUMONIA, UNSPECIFIED LATERALITY: Primary | ICD-10-CM

## 2023-11-27 RX ORDER — BENZONATATE 100 MG/1
100 CAPSULE ORAL 3 TIMES DAILY PRN
Qty: 30 CAPSULE | Refills: 0 | Status: SHIPPED | OUTPATIENT
Start: 2023-11-27 | End: 2023-12-07

## 2023-11-27 RX ORDER — AMOXICILLIN 500 MG/1
500 CAPSULE ORAL 3 TIMES DAILY
Qty: 15 CAPSULE | Refills: 0 | Status: SHIPPED | OUTPATIENT
Start: 2023-11-27 | End: 2023-12-02

## 2023-11-27 RX ORDER — AZITHROMYCIN 250 MG/1
TABLET, FILM COATED ORAL
Qty: 6 TABLET | Refills: 0 | Status: SHIPPED | OUTPATIENT
Start: 2023-11-27 | End: 2023-12-01

## 2023-11-27 RX ORDER — ALBUTEROL SULFATE 90 UG/1
2 AEROSOL, METERED RESPIRATORY (INHALATION) EVERY 4 HOURS PRN
Qty: 6.7 G | Refills: 0 | Status: SHIPPED | OUTPATIENT
Start: 2023-11-27

## 2023-11-27 NOTE — TELEPHONE ENCOUNTER
Future Appointments   Date Time Provider Marisol Lopez   12/5/2023  9:20 AM PFS DONTE RM1 PFS [facility]     Patient had VV today.

## 2023-11-27 NOTE — PROGRESS NOTES
Subjective:      No chief complaint on file. HISTORY OF PRESENT ILLNESS  HPI  HPI obtained per patient report. Maverick Sales is a pleasant 34year old female presenting for follow-up. Her viral syndrome symptoms improved for a few days then began to worsen 3 days ago. She reports low-grade fever of 99.7, fatigue, cough, and wheezing. PAST PATIENT HISTORY  Past Medical History:   Diagnosis Date    Abdominal pain     Allergic rhinitis     Bloating     Flatulence/gas pain/belching     Gestational diabetes     Heartburn     Indigestion     Menses painful     Nausea     Pain with bowel movements     Vitamin D deficiency      Past Surgical History:   Procedure Laterality Date    CHOLECYSTECTOMY         CURRENT MEDICATIONS  Outpatient Medications Marked as Taking for the 11/27/23 encounter (Telemedicine) with Mike Portillo MD   Medication Sig Dispense Refill    benzonatate 100 MG Oral Cap Take 1 capsule (100 mg total) by mouth 3 (three) times daily as needed for cough. SWALLOW CAPSULE WHOLE. DO NOT BREAK, CUT, CRUSH, CHEW, OR DISSOLVE CAPSULE. 30 capsule 0    albuterol 108 (90 Base) MCG/ACT Inhalation Aero Soln Inhale 2 puffs into the lungs every 4 (four) hours as needed for Shortness of Breath. 6.7 g 0    azithromycin 250 MG Oral Tab Take 2 tablets (500 mg total) by mouth daily for 1 day, THEN 1 tablet (250 mg total) daily for 4 days. 6 tablet 0    amoxicillin 500 MG Oral Cap Take 1 capsule (500 mg total) by mouth 3 (three) times daily for 5 days.  15 capsule 0       HEALTH MAINTENANCE  Immunization History   Administered Date(s) Administered    Covid-19 Vaccine Pfizer 30 mcg/0.3 ml 04/06/2021, 04/27/2021, 01/18/2022    FLULAVAL 6 months & older 0.5 ml Prefilled syringe (49595) 12/05/2019, 12/07/2021    FLUZONE 6 months and older PFS 0.5 ml (34080) 12/05/2019    Fluvirin, 3 Years & >, Im 09/13/2013, 10/06/2016    TDAP 01/01/2013, 01/12/2017, 04/13/2020    Tb Intradermal Test 09/08/2021   Pended Date(s) Pended FLULAVAL 6 months & older 0.5 ml Prefilled syringe (53701) 04/05/2021       ALLERGIES AND DRUG REACTIONS  Allergies   Allergen Reactions    Seasonal ITCHING and Runny nose       Family History   Problem Relation Age of Onset    Diabetes Father         Type II    Other (Other) Father         Liver and Kidney transplant     Other (Other) Mother         Fatty liver     Other (Other) Maternal Grandfather         infuzena     Social History     Socioeconomic History    Marital status:    Tobacco Use    Smoking status: Never    Smokeless tobacco: Never   Vaping Use    Vaping Use: Never used   Substance and Sexual Activity    Alcohol use: Never    Drug use: Never    Sexual activity: Yes     Partners: Male   Other Topics Concern    Caffeine Concern No    Exercise No    Seat Belt No    Special Diet No    Stress Concern No    Weight Concern No       Review of Systems   Constitutional:  Positive for fatigue and fever. Respiratory:  Positive for cough and wheezing. All other systems reviewed and are negative. Objective:      LMP 10/30/2023 (Exact Date)   There is no height or weight on file to calculate BMI. Physical Exam  Constitutional:       General: She is not in acute distress. Appearance: She is not ill-appearing or toxic-appearing. Pulmonary:      Effort: Pulmonary effort is normal.   Musculoskeletal:      Cervical back: Neck supple. Neurological:      Mental Status: She is alert and oriented to person, place, and time. Assessment and Plan:      1. Community acquired pneumonia, unspecified laterality (Primary)  -     Benzonatate; Take 1 capsule (100 mg total) by mouth 3 (three) times daily as needed for cough. SWALLOW CAPSULE WHOLE. DO NOT BREAK, CUT, CRUSH, CHEW, OR DISSOLVE CAPSULE. Dispense: 30 capsule; Refill: 0  -     Albuterol Sulfate HFA; Inhale 2 puffs into the lungs every 4 (four) hours as needed for Shortness of Breath.   Dispense: 6.7 g; Refill: 0  - Azithromycin; Take 2 tablets (500 mg total) by mouth daily for 1 day, THEN 1 tablet (250 mg total) daily for 4 days. Dispense: 6 tablet; Refill: 0  -     Amoxicillin; Take 1 capsule (500 mg total) by mouth 3 (three) times daily for 5 days. Dispense: 15 capsule; Refill: 0    Return if symptoms worsen or fail to improve. - likely CAP on recent viral syndrome  - recommended amoxicillin and azithromycin as prescribed   - may take tessalon perles as needed for cough and albuterol inhaler as needed for wheezing  - may take tylenol or ibuprofen as needed for pain/fever    Patient verbalized understanding of assessment and recommendations. All questions and concerns were addressed. Telehealth appointment with video and audio was scheduled and completed with the patient's informed consent. Telehealth appointment took place in context of CDC social distancing guidelines during the Covid-19 pandemic.     Electronically signed by Stephanie Salter MD

## 2023-11-27 NOTE — TELEPHONE ENCOUNTER
Patient also sent [employer] message 11/24/23  Gera Gabe   to P Em[facility] 20 Clinical Staff (supporting Aubree Valdes MD)     11/24/23 11:02 AM  Good morning Dr. Graham kime doing well and enjoying the weekend. After my last visit Nichelle been taking MucinexDM for my cough and congestion. Unfortunately, the symptoms are not clearing up and are still very bad. I know we discussed the possibility of me taking an antibiotic to help during my visit. I wanted to see if you could still prescribe the medication and send it to my pharmacy. Thank you and enjoy the rest of your day! Last OV 11/14/23  Negative Strep  Viral syndrome  - rapid strep negative  - symptoms likely 2/2 viral syndrome  - recommended rest, drinking plenty of fluids, and symptomatic therapy  - she declines tessalon perles PRN cough and zofran PRN nausea at this time but may call if she changes her mind  - we discussed that symptoms of viral syndrome are expected to improve within 14 days    Do you want patient to follow up for reevaluation?

## 2023-11-27 NOTE — TELEPHONE ENCOUNTER
From: Nathan Stock  To: Darryl Casas  Sent: 11/24/2023 11:02 AM CST  Subject: Antibiotic for cough    Good morning Dr. Robinson bansal doing well and enjoying the weekend. After my last visit Nichelle been taking MucinexDM for my cough and congestion. Unfortunately, the symptoms are not clearing up and are still very bad. I know we discussed the possibility of me taking an antibiotic to help during my visit. I wanted to see if you could still prescribe the medication and send it to my pharmacy. Thank you and enjoy the rest of your day!

## 2023-11-27 NOTE — TELEPHONE ENCOUNTER
Future Appointments   Date Time Provider Marisol Jessica   11/27/2023 10:10 AM Zia Hardy MD EMG 20 EMG [address]   12/5/2023  9:20 AM PFS DONTE RM1 [facility]

## 2023-12-05 ENCOUNTER — OFFICE VISIT (OUTPATIENT)
Dept: FAMILY MEDICINE CLINIC | Facility: CLINIC | Age: 38
End: 2023-12-05
Payer: COMMERCIAL

## 2023-12-05 ENCOUNTER — HOSPITAL ENCOUNTER (OUTPATIENT)
Dept: GENERAL RADIOLOGY | Age: 38
Discharge: HOME | End: 2023-12-05
Attending: FAMILY MEDICINE
Payer: COMMERCIAL

## 2023-12-05 VITALS
SYSTOLIC BLOOD PRESSURE: 122 MMHG | RESPIRATION RATE: 18 BRPM | BODY MASS INDEX: 28.99 KG/M2 | DIASTOLIC BLOOD PRESSURE: 68 MMHG | OXYGEN SATURATION: 99 % | HEART RATE: 77 BPM | TEMPERATURE: 98 F | WEIGHT: 163.63 LBS | HEIGHT: 63 IN

## 2023-12-05 DIAGNOSIS — J18.9 COMMUNITY ACQUIRED PNEUMONIA, UNSPECIFIED LATERALITY: Primary | ICD-10-CM

## 2023-12-05 DIAGNOSIS — J18.9 COMMUNITY ACQUIRED PNEUMONIA, UNSPECIFIED LATERALITY: ICD-10-CM

## 2023-12-05 PROCEDURE — 3008F BODY MASS INDEX DOCD: CPT | Performed by: FAMILY MEDICINE

## 2023-12-05 PROCEDURE — 99214 OFFICE O/P EST MOD 30 MIN: CPT | Performed by: FAMILY MEDICINE

## 2023-12-05 PROCEDURE — 3078F DIAST BP <80 MM HG: CPT | Performed by: FAMILY MEDICINE

## 2023-12-05 PROCEDURE — 71046 X-RAY EXAM CHEST 2 VIEWS: CPT | Performed by: FAMILY MEDICINE

## 2023-12-05 PROCEDURE — 3074F SYST BP LT 130 MM HG: CPT | Performed by: FAMILY MEDICINE

## 2023-12-05 RX ORDER — DOXYCYCLINE HYCLATE 100 MG
100 TABLET ORAL 2 TIMES DAILY
Qty: 20 TABLET | Refills: 0 | Status: SHIPPED | OUTPATIENT
Start: 2023-12-05 | End: 2023-12-15

## 2023-12-05 RX ORDER — PREDNISONE 20 MG/1
20 TABLET ORAL 2 TIMES DAILY
Qty: 10 TABLET | Refills: 0 | Status: SHIPPED | OUTPATIENT
Start: 2023-12-05 | End: 2023-12-10

## 2023-12-05 RX ORDER — DEXTROMETHORPHAN HYDROBROMIDE AND PROMETHAZINE HYDROCHLORIDE 15; 6.25 MG/5ML; MG/5ML
5 SYRUP ORAL 4 TIMES DAILY PRN
Qty: 280 ML | Refills: 0 | Status: SHIPPED | OUTPATIENT
Start: 2023-12-05 | End: 2023-12-19

## 2023-12-12 ENCOUNTER — APPOINTMENT (OUTPATIENT)
Dept: GENERAL RADIOLOGY | Age: 38
End: 2023-12-12
Attending: EMERGENCY MEDICINE
Payer: COMMERCIAL

## 2023-12-12 ENCOUNTER — HOSPITAL ENCOUNTER (EMERGENCY)
Age: 38
Discharge: HOME | End: 2023-12-12
Attending: EMERGENCY MEDICINE
Payer: COMMERCIAL

## 2023-12-12 VITALS
WEIGHT: 160 LBS | SYSTOLIC BLOOD PRESSURE: 120 MMHG | HEART RATE: 102 BPM | TEMPERATURE: 98 F | DIASTOLIC BLOOD PRESSURE: 75 MMHG | HEIGHT: 63 IN | RESPIRATION RATE: 18 BRPM | BODY MASS INDEX: 28.35 KG/M2 | OXYGEN SATURATION: 99 %

## 2023-12-12 DIAGNOSIS — J98.01 BRONCHOSPASM: ICD-10-CM

## 2023-12-12 DIAGNOSIS — J18.9 PNEUMONIA OF RIGHT LOWER LOBE DUE TO INFECTIOUS ORGANISM: Primary | ICD-10-CM

## 2023-12-12 LAB
ANION GAP SERPL CALC-SCNC: 4 MMOL/L (ref 0–18)
BASOPHILS # BLD AUTO: 0.05 X10(3) UL (ref 0–0.2)
BASOPHILS NFR BLD AUTO: 0.6 %
BUN BLD-MCNC: 18 MG/DL (ref 9–23)
CALCIUM BLD-MCNC: 9 MG/DL (ref 8.5–10.1)
CHLORIDE SERPL-SCNC: 102 MMOL/L (ref 98–112)
CO2 SERPL-SCNC: 29 MMOL/L (ref 21–32)
CREAT BLD-MCNC: 0.94 MG/DL
EGFRCR SERPLBLD CKD-EPI 2021: 79 ML/MIN/1.73M2 (ref 60–?)
EOSINOPHIL # BLD AUTO: 0.07 X10(3) UL (ref 0–0.7)
EOSINOPHIL NFR BLD AUTO: 0.8 %
ERYTHROCYTE [DISTWIDTH] IN BLOOD BY AUTOMATED COUNT: 13.6 %
GLUCOSE BLD-MCNC: 97 MG/DL (ref 70–99)
HCT VFR BLD AUTO: 39.7 %
HGB BLD-MCNC: 13.1 G/DL
IMM GRANULOCYTES # BLD AUTO: 0.03 X10(3) UL (ref 0–1)
IMM GRANULOCYTES NFR BLD: 0.3 %
LYMPHOCYTES # BLD AUTO: 1.29 X10(3) UL (ref 1–4)
LYMPHOCYTES NFR BLD AUTO: 14.4 %
MCH RBC QN AUTO: 28.6 PG (ref 26–34)
MCHC RBC AUTO-ENTMCNC: 33 G/DL (ref 31–37)
MCV RBC AUTO: 86.7 FL
MONOCYTES # BLD AUTO: 0.71 X10(3) UL (ref 0.1–1)
MONOCYTES NFR BLD AUTO: 7.9 %
NEUTROPHILS # BLD AUTO: 6.83 X10 (3) UL (ref 1.5–7.7)
NEUTROPHILS # BLD AUTO: 6.83 X10(3) UL (ref 1.5–7.7)
NEUTROPHILS NFR BLD AUTO: 76 %
OSMOLALITY SERPL CALC.SUM OF ELEC: 282 MOSM/KG (ref 275–295)
PLATELET # BLD AUTO: 308 10(3)UL (ref 150–450)
POTASSIUM SERPL-SCNC: 4 MMOL/L (ref 3.5–5.1)
RBC # BLD AUTO: 4.58 X10(6)UL
SODIUM SERPL-SCNC: 135 MMOL/L (ref 136–145)
WBC # BLD AUTO: 9 X10(3) UL (ref 4–11)

## 2023-12-12 PROCEDURE — 94640 AIRWAY INHALATION TREATMENT: CPT

## 2023-12-12 PROCEDURE — 80048 BASIC METABOLIC PNL TOTAL CA: CPT | Performed by: EMERGENCY MEDICINE

## 2023-12-12 PROCEDURE — 96374 THER/PROPH/DIAG INJ IV PUSH: CPT

## 2023-12-12 PROCEDURE — 71045 X-RAY EXAM CHEST 1 VIEW: CPT | Performed by: EMERGENCY MEDICINE

## 2023-12-12 PROCEDURE — 99284 EMERGENCY DEPT VISIT MOD MDM: CPT

## 2023-12-12 PROCEDURE — 96361 HYDRATE IV INFUSION ADD-ON: CPT

## 2023-12-12 PROCEDURE — 85025 COMPLETE CBC W/AUTO DIFF WBC: CPT | Performed by: EMERGENCY MEDICINE

## 2023-12-12 RX ORDER — ALBUTEROL SULFATE 90 UG/1
2 AEROSOL, METERED RESPIRATORY (INHALATION) EVERY 4 HOURS PRN
Qty: 1 EACH | Refills: 0 | Status: SHIPPED | OUTPATIENT
Start: 2023-12-12 | End: 2023-12-21

## 2023-12-12 RX ORDER — IPRATROPIUM BROMIDE AND ALBUTEROL SULFATE 2.5; .5 MG/3ML; MG/3ML
3 SOLUTION RESPIRATORY (INHALATION) ONCE
Status: COMPLETED | OUTPATIENT
Start: 2023-12-12 | End: 2023-12-12

## 2023-12-12 RX ORDER — ONDANSETRON 8 MG/1
8 TABLET, ORALLY DISINTEGRATING ORAL EVERY 6 HOURS PRN
Qty: 10 TABLET | Refills: 0 | Status: SHIPPED | OUTPATIENT
Start: 2023-12-12

## 2023-12-12 RX ORDER — METHYLPREDNISOLONE 4 MG/1
TABLET ORAL
Qty: 21 TABLET | Refills: 0 | Status: SHIPPED | OUTPATIENT
Start: 2023-12-12 | End: 2023-12-21 | Stop reason: ALTCHOICE

## 2023-12-12 RX ORDER — ALBUTEROL SULFATE 90 UG/1
2 AEROSOL, METERED RESPIRATORY (INHALATION) ONCE
Status: DISCONTINUED | OUTPATIENT
Start: 2023-12-12 | End: 2023-12-12

## 2023-12-12 RX ORDER — METHYLPREDNISOLONE SODIUM SUCCINATE 125 MG/2ML
125 INJECTION, POWDER, LYOPHILIZED, FOR SOLUTION INTRAMUSCULAR; INTRAVENOUS ONCE
Status: COMPLETED | OUTPATIENT
Start: 2023-12-12 | End: 2023-12-12

## 2023-12-12 NOTE — DISCHARGE INSTRUCTIONS
Push fluids  Rest  Complete the course of antibiotic  Medrol Dosepak starts tomorrow  Zofran for nausea  Albuterol treatments with spacer-2 puffs around-the-clock every 4-6 hours    Contact your primary care doctor in the morning to arrange close follow-up

## 2023-12-12 NOTE — ED INITIAL ASSESSMENT (HPI)
Has been sick with URI and cough since 11/16- worse after Thanksgiving and dx with pneumonia- took antibiotics-- followed up after and pneumonia still seen on CXR - started steroids, alb and new antibiotics on 12/5--  cough remains with fatigue-

## 2023-12-21 ENCOUNTER — OFFICE VISIT (OUTPATIENT)
Dept: FAMILY MEDICINE CLINIC | Facility: CLINIC | Age: 38
End: 2023-12-21
Payer: COMMERCIAL

## 2023-12-21 ENCOUNTER — LAB ENCOUNTER (OUTPATIENT)
Dept: LAB | Age: 38
End: 2023-12-21
Attending: STUDENT IN AN ORGANIZED HEALTH CARE EDUCATION/TRAINING PROGRAM
Payer: COMMERCIAL

## 2023-12-21 VITALS
HEART RATE: 87 BPM | RESPIRATION RATE: 18 BRPM | HEIGHT: 63 IN | BODY MASS INDEX: 28.57 KG/M2 | SYSTOLIC BLOOD PRESSURE: 110 MMHG | WEIGHT: 161.25 LBS | OXYGEN SATURATION: 97 % | TEMPERATURE: 97 F | DIASTOLIC BLOOD PRESSURE: 62 MMHG

## 2023-12-21 DIAGNOSIS — R20.2 TINGLING IN EXTREMITIES: ICD-10-CM

## 2023-12-21 DIAGNOSIS — Z83.3 FAMILY HISTORY OF DIABETES MELLITUS: ICD-10-CM

## 2023-12-21 DIAGNOSIS — E55.9 VITAMIN D DEFICIENCY: ICD-10-CM

## 2023-12-21 DIAGNOSIS — R55 VASOVAGAL EPISODE: Primary | ICD-10-CM

## 2023-12-21 DIAGNOSIS — E86.0 DEHYDRATION: ICD-10-CM

## 2023-12-21 DIAGNOSIS — U07.1 COVID-19: ICD-10-CM

## 2023-12-21 DIAGNOSIS — Z51.81 MEDICATION MONITORING ENCOUNTER: ICD-10-CM

## 2023-12-21 DIAGNOSIS — H69.90 DYSFUNCTION OF EUSTACHIAN TUBE, UNSPECIFIED LATERALITY: ICD-10-CM

## 2023-12-21 DIAGNOSIS — Z86.32 HISTORY OF GESTATIONAL DIABETES: ICD-10-CM

## 2023-12-21 DIAGNOSIS — Z13.220 SCREENING CHOLESTEROL LEVEL: ICD-10-CM

## 2023-12-21 LAB
ALBUMIN SERPL-MCNC: 3.6 G/DL (ref 3.4–5)
ALBUMIN/GLOB SERPL: 0.9 {RATIO} (ref 1–2)
ALP LIVER SERPL-CCNC: 68 U/L
ALT SERPL-CCNC: 20 U/L
ANION GAP SERPL CALC-SCNC: 6 MMOL/L (ref 0–18)
AST SERPL-CCNC: 10 U/L (ref 15–37)
BILIRUB SERPL-MCNC: 1.1 MG/DL (ref 0.1–2)
BUN BLD-MCNC: 13 MG/DL (ref 9–23)
CALCIUM BLD-MCNC: 9 MG/DL (ref 8.5–10.1)
CHLORIDE SERPL-SCNC: 107 MMOL/L (ref 98–112)
CHOLEST SERPL-MCNC: 173 MG/DL (ref ?–200)
CO2 SERPL-SCNC: 26 MMOL/L (ref 21–32)
CREAT BLD-MCNC: 0.98 MG/DL
EGFRCR SERPLBLD CKD-EPI 2021: 75 ML/MIN/1.73M2 (ref 60–?)
ERYTHROCYTE [DISTWIDTH] IN BLOOD BY AUTOMATED COUNT: 13.6 %
EST. AVERAGE GLUCOSE BLD GHB EST-MCNC: 97 MG/DL (ref 68–126)
FASTING PATIENT LIPID ANSWER: YES
FASTING STATUS PATIENT QL REPORTED: YES
GLOBULIN PLAS-MCNC: 4.2 G/DL (ref 2.8–4.4)
GLUCOSE BLD-MCNC: 95 MG/DL (ref 70–99)
HBA1C MFR BLD: 5 % (ref ?–5.7)
HCT VFR BLD AUTO: 36.3 %
HDLC SERPL-MCNC: 71 MG/DL (ref 40–59)
HGB BLD-MCNC: 12.2 G/DL
LDLC SERPL CALC-MCNC: 90 MG/DL (ref ?–100)
MCH RBC QN AUTO: 29 PG (ref 26–34)
MCHC RBC AUTO-ENTMCNC: 33.6 G/DL (ref 31–37)
MCV RBC AUTO: 86.2 FL
NONHDLC SERPL-MCNC: 102 MG/DL (ref ?–130)
OSMOLALITY SERPL CALC.SUM OF ELEC: 288 MOSM/KG (ref 275–295)
PLATELET # BLD AUTO: 191 10(3)UL (ref 150–450)
POTASSIUM SERPL-SCNC: 3.9 MMOL/L (ref 3.5–5.1)
PROT SERPL-MCNC: 7.8 G/DL (ref 6.4–8.2)
RBC # BLD AUTO: 4.21 X10(6)UL
SODIUM SERPL-SCNC: 139 MMOL/L (ref 136–145)
TRIGL SERPL-MCNC: 63 MG/DL (ref 30–149)
TSI SER-ACNC: 1.55 MIU/ML (ref 0.36–3.74)
VIT B12 SERPL-MCNC: 638 PG/ML (ref 193–986)
VIT D+METAB SERPL-MCNC: 39.4 NG/ML (ref 30–100)
VLDLC SERPL CALC-MCNC: 10 MG/DL (ref 0–30)
WBC # BLD AUTO: 7.6 X10(3) UL (ref 4–11)

## 2023-12-21 PROCEDURE — 82607 VITAMIN B-12: CPT

## 2023-12-21 PROCEDURE — 3008F BODY MASS INDEX DOCD: CPT | Performed by: STUDENT IN AN ORGANIZED HEALTH CARE EDUCATION/TRAINING PROGRAM

## 2023-12-21 PROCEDURE — 99213 OFFICE O/P EST LOW 20 MIN: CPT | Performed by: STUDENT IN AN ORGANIZED HEALTH CARE EDUCATION/TRAINING PROGRAM

## 2023-12-21 PROCEDURE — 3078F DIAST BP <80 MM HG: CPT | Performed by: STUDENT IN AN ORGANIZED HEALTH CARE EDUCATION/TRAINING PROGRAM

## 2023-12-21 PROCEDURE — 36415 COLL VENOUS BLD VENIPUNCTURE: CPT

## 2023-12-21 PROCEDURE — 82306 VITAMIN D 25 HYDROXY: CPT

## 2023-12-21 PROCEDURE — 85027 COMPLETE CBC AUTOMATED: CPT

## 2023-12-21 PROCEDURE — 83036 HEMOGLOBIN GLYCOSYLATED A1C: CPT

## 2023-12-21 PROCEDURE — 3074F SYST BP LT 130 MM HG: CPT | Performed by: STUDENT IN AN ORGANIZED HEALTH CARE EDUCATION/TRAINING PROGRAM

## 2023-12-21 PROCEDURE — 80053 COMPREHEN METABOLIC PANEL: CPT

## 2023-12-21 PROCEDURE — 80061 LIPID PANEL: CPT

## 2023-12-21 PROCEDURE — 84443 ASSAY THYROID STIM HORMONE: CPT

## 2023-12-22 NOTE — PROGRESS NOTES
Cesar Mason,     Good news - your lab results were all within normal range including your electrolyte levels. Please continue the treatment plan we discussed during your visit, and I hope you feel better soon. Please let me know if you have any questions.      Take care,  Dr. Heladio You

## 2024-03-18 ENCOUNTER — TELEPHONE (OUTPATIENT)
Dept: CASE MANAGEMENT | Age: 39
End: 2024-03-18

## 2024-03-18 NOTE — TELEPHONE ENCOUNTER
Please let pt know her insurance is refusing to cover her MRIs. Per their criteria, she needs to demonstrate no improvement of symptoms with initial therapies including PT in order for MRIs to be covered. Please let pt know to start PT and RTC if symptoms do not improve

## 2024-03-18 NOTE — TELEPHONE ENCOUNTER
Hi Dr. Patel        Status: DENIED        Reference number [id number](Lumbar), [id number](cervical)     A copy of the denial letter is filed under the MEDIA tab, reference for complete details. You may reach out to ANALILIA at [phone] to discuss decision.     Please reach out to patient with plan of care.      Thank you  Lanny                 Pts  called in stating pt has been cold and very sleepy and is very pale, since she's been out of the hospital with an intestinal infection. Advised Greg that the best bet is to take pt back to the ER. Greg stated pt will not go back to the ER. Pt has an appt. On Friday 1/5/2024. Greg is asking if there is something earlier than pts appt. On Friday.

## 2024-03-25 ENCOUNTER — HOSPITAL ENCOUNTER (OUTPATIENT)
Dept: GENERAL RADIOLOGY | Age: 39
Discharge: HOME | End: 2024-03-25
Attending: STUDENT IN AN ORGANIZED HEALTH CARE EDUCATION/TRAINING PROGRAM
Payer: COMMERCIAL

## 2024-03-25 ENCOUNTER — OFFICE VISIT (OUTPATIENT)
Dept: FAMILY MEDICINE CLINIC | Facility: CLINIC | Age: 39
End: 2024-03-25
Payer: COMMERCIAL

## 2024-03-25 VITALS
BODY MASS INDEX: 29.77 KG/M2 | DIASTOLIC BLOOD PRESSURE: 70 MMHG | RESPIRATION RATE: 16 BRPM | OXYGEN SATURATION: 97 % | TEMPERATURE: 97 F | WEIGHT: 168 LBS | HEART RATE: 79 BPM | SYSTOLIC BLOOD PRESSURE: 110 MMHG | HEIGHT: 63 IN

## 2024-03-25 DIAGNOSIS — M54.42 CHRONIC BILATERAL LOW BACK PAIN WITH BILATERAL SCIATICA: ICD-10-CM

## 2024-03-25 DIAGNOSIS — G89.29 CHRONIC NECK PAIN: ICD-10-CM

## 2024-03-25 DIAGNOSIS — G89.29 CHRONIC BILATERAL LOW BACK PAIN WITH BILATERAL SCIATICA: ICD-10-CM

## 2024-03-25 DIAGNOSIS — M54.2 CHRONIC NECK PAIN: Primary | ICD-10-CM

## 2024-03-25 DIAGNOSIS — M54.41 CHRONIC BILATERAL LOW BACK PAIN WITH BILATERAL SCIATICA: ICD-10-CM

## 2024-03-25 DIAGNOSIS — R20.2 TINGLING IN EXTREMITIES: ICD-10-CM

## 2024-03-25 DIAGNOSIS — M22.2X2 PATELLOFEMORAL ARTHRALGIA OF BOTH KNEES: ICD-10-CM

## 2024-03-25 DIAGNOSIS — M54.2 CHRONIC NECK PAIN: ICD-10-CM

## 2024-03-25 DIAGNOSIS — G89.29 CHRONIC NECK PAIN: Primary | ICD-10-CM

## 2024-03-25 DIAGNOSIS — M22.2X1 PATELLOFEMORAL ARTHRALGIA OF BOTH KNEES: ICD-10-CM

## 2024-03-25 PROCEDURE — 3074F SYST BP LT 130 MM HG: CPT | Performed by: STUDENT IN AN ORGANIZED HEALTH CARE EDUCATION/TRAINING PROGRAM

## 2024-03-25 PROCEDURE — 3008F BODY MASS INDEX DOCD: CPT | Performed by: STUDENT IN AN ORGANIZED HEALTH CARE EDUCATION/TRAINING PROGRAM

## 2024-03-25 PROCEDURE — 3078F DIAST BP <80 MM HG: CPT | Performed by: STUDENT IN AN ORGANIZED HEALTH CARE EDUCATION/TRAINING PROGRAM

## 2024-03-25 PROCEDURE — 99214 OFFICE O/P EST MOD 30 MIN: CPT | Performed by: STUDENT IN AN ORGANIZED HEALTH CARE EDUCATION/TRAINING PROGRAM

## 2024-03-25 PROCEDURE — 72050 X-RAY EXAM NECK SPINE 4/5VWS: CPT | Performed by: STUDENT IN AN ORGANIZED HEALTH CARE EDUCATION/TRAINING PROGRAM

## 2024-03-25 PROCEDURE — 72110 X-RAY EXAM L-2 SPINE 4/>VWS: CPT | Performed by: STUDENT IN AN ORGANIZED HEALTH CARE EDUCATION/TRAINING PROGRAM

## 2024-03-25 RX ORDER — MELOXICAM 7.5 MG/1
7.5 TABLET ORAL DAILY
Qty: 30 TABLET | Refills: 0 | Status: SHIPPED | OUTPATIENT
Start: 2024-03-25 | End: 2024-04-24
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
## 2024-03-26 NOTE — PROGRESS NOTES
Cesar Mason,     Your x-ray results showed mild degenerative changes in a joint and disc in the neck but were otherwise normal. There were no signs of any other disc abnormalities, dislocation, or fracture, which is reassuring. It is safe to start physical therapy at this time as we discussed during your visit. If you have any other questions or concerns, please let me know.     I hope you feel better soon!  Dr. Huerta

## 2024-04-04 ENCOUNTER — TELEPHONE (OUTPATIENT)
Dept: PHYSICAL THERAPY | Facility: HOSPITAL | Age: 39
End: 2024-04-04

## 2024-04-05 ENCOUNTER — TELEPHONE (OUTPATIENT)
Dept: PHYSICAL THERAPY | Facility: HOSPITAL | Age: 39
End: 2024-04-05

## 2024-04-09 ENCOUNTER — APPOINTMENT (OUTPATIENT)
Facility: LOCATION | Age: 39
End: 2024-04-09
Attending: STUDENT IN AN ORGANIZED HEALTH CARE EDUCATION/TRAINING PROGRAM
Payer: COMMERCIAL

## 2024-04-09 ENCOUNTER — HOSPITAL ENCOUNTER (OUTPATIENT)
Dept: MAMMOGRAPHY | Age: 39
Discharge: HOME | End: 2024-04-09
Attending: STUDENT IN AN ORGANIZED HEALTH CARE EDUCATION/TRAINING PROGRAM
Payer: COMMERCIAL

## 2024-04-09 DIAGNOSIS — Z12.31 SCREENING MAMMOGRAM, ENCOUNTER FOR: ICD-10-CM

## 2024-04-09 PROCEDURE — 77063 BREAST TOMOSYNTHESIS BI: CPT | Performed by: STUDENT IN AN ORGANIZED HEALTH CARE EDUCATION/TRAINING PROGRAM

## 2024-04-09 PROCEDURE — 77067 SCR MAMMO BI INCL CAD: CPT | Performed by: STUDENT IN AN ORGANIZED HEALTH CARE EDUCATION/TRAINING PROGRAM

## 2024-04-11 ENCOUNTER — APPOINTMENT (OUTPATIENT)
Facility: LOCATION | Age: 39
End: 2024-04-11
Attending: STUDENT IN AN ORGANIZED HEALTH CARE EDUCATION/TRAINING PROGRAM
Payer: COMMERCIAL

## 2024-04-12 ENCOUNTER — OFFICE VISIT (OUTPATIENT)
Facility: LOCATION | Age: 39
End: 2024-04-12
Attending: STUDENT IN AN ORGANIZED HEALTH CARE EDUCATION/TRAINING PROGRAM
Payer: COMMERCIAL

## 2024-04-12 DIAGNOSIS — G89.29 CHRONIC BILATERAL LOW BACK PAIN WITH BILATERAL SCIATICA: Primary | ICD-10-CM

## 2024-04-12 DIAGNOSIS — R20.2 TINGLING IN EXTREMITIES: ICD-10-CM

## 2024-04-12 DIAGNOSIS — M22.2X2 PATELLOFEMORAL ARTHRALGIA OF BOTH KNEES: ICD-10-CM

## 2024-04-12 DIAGNOSIS — M54.2 CHRONIC NECK PAIN: ICD-10-CM

## 2024-04-12 DIAGNOSIS — M22.2X1 PATELLOFEMORAL ARTHRALGIA OF BOTH KNEES: ICD-10-CM

## 2024-04-12 DIAGNOSIS — G89.29 CHRONIC NECK PAIN: ICD-10-CM

## 2024-04-12 DIAGNOSIS — M54.42 CHRONIC BILATERAL LOW BACK PAIN WITH BILATERAL SCIATICA: Primary | ICD-10-CM

## 2024-04-12 DIAGNOSIS — M54.41 CHRONIC BILATERAL LOW BACK PAIN WITH BILATERAL SCIATICA: Primary | ICD-10-CM

## 2024-04-12 PROCEDURE — 97110 THERAPEUTIC EXERCISES: CPT

## 2024-04-12 PROCEDURE — 97161 PT EVAL LOW COMPLEX 20 MIN: CPT
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
## 2024-04-16 ENCOUNTER — OFFICE VISIT (OUTPATIENT)
Facility: LOCATION | Age: 39
End: 2024-04-16
Attending: STUDENT IN AN ORGANIZED HEALTH CARE EDUCATION/TRAINING PROGRAM
Payer: COMMERCIAL

## 2024-04-16 PROCEDURE — 97110 THERAPEUTIC EXERCISES: CPT

## 2024-04-16 PROCEDURE — 97140 MANUAL THERAPY 1/> REGIONS: CPT

## 2024-04-16 NOTE — PROGRESS NOTES
Diagnosis:   Chronic bilateral low back pain with bilateral sciatica (M54.42,M54.41,G89.29)  Tingling in extremities (R20.2)  Chronic neck pain (M54.2,G89.29)  Patellofemoral arthralgia of both knees (M22.2X1,M22.2X2)      Referring Provider: Jorge Huerta  Date of Evaluation:    4/12/24    Precautions:  None Next MD visit:     Date of Surgery: n/a   Insurance Primary/Secondary: BCBS POS / N/A     # Auth Visits: 10            Subjective: exercises went fine with no issue, did not do them everyday but will concentrate on doing them this week daily     Pain: 2/10 (R) side of neck       Objective: see flowsheet      Assessment:   Increased tone to (R) upper quarter musculature. Decreased mid to upper thoracic spine mobility, (R) 1st rib restriction   Tolerated manual and exercise intervention fairly well with no increase in pain.   No reports of any paresthesia in the (L) UE during session      Goals:   Pt will improve cervical AROM flexion by 5-8 degrees with less reported restriction and pain to improve tolerance for looking down to tie shoes   Pt will improve cervical AROM (R) & (L) rotation by 5-8 degrees to improve tolerance for turning head to check blind spot while driving  Pt will have improved thoracic PA mobility to WNL to improve cervical ROM as well as promote upright posturing and decreased pain   Pt will demonstrate at least 4/5 (B) UE & LE strength in order to maximize effectiveness and efficiency with functional activities   Pt will be able to  her children with less reported pain and restriction   Pt will be able to perform all ADLs and light household chores/tasks with less pain and restriction   Pt will be independent and compliant with comprehensive HEP to maintain progress achieved in PT      Plan:   Date: 4/16/2024  TX#: 2/10 Date:                 TX#: 3/ Date:                 TX#: 4/ Date:                 TX#: 5/ Date:   Tx#: 6/   Manual (15 min)       STM/MFR suboccipitals, cervical  paraspinals, UT, LS, scalenes   (R) 1st rib mobilization, grade 3  Prone mid to upper thoracic PA glide, grade 3       TherEx (30 min)       UBE x 6 min       H/L chin tuck x 15 (5 seconds)       Seated cervical retraction x 20 (5 second hold)       Seated 1st rib mobilization x 20       Seated (R) UT & LS stretch 30 sec x 2       Seated upper back stretch 30 sec x 2       Seated scapular retraction x 10 (5 second hold)       Doorway pec stretch 30 sec x 3       HEP:   Access Code: [email or web address]  URL: [email or web address]  Date: 04/12/2024  Prepared by: Chandan Eubanks    Exercises  - Seated Cervical Sidebending Stretch  - 1-2 x daily - 7 x weekly - 3 sets - 30 seconds hold  - Seated Levator Scapulae Stretch  - 1-2 x daily - 7 x weekly - 3 sets - 30 seconds hold  - Supine Hamstring Stretch  - 1-2 x daily - 7 x weekly - 3 sets - 30 seconds hold  - Supine Piriformis Stretch with Foot on Ground  - 1-2 x daily - 7 x weekly - 3 sets - 30 seconds hold  - Supine Figure 4 Piriformis Stretch  - 1-2 x daily - 7 x weekly - 3 sets - 30 seconds hold    Charges: Manual 1; TherEx 2       Total Timed Treatment: 45 min  Total Treatment Time: 45 min

## 2024-04-18 ENCOUNTER — OFFICE VISIT (OUTPATIENT)
Facility: LOCATION | Age: 39
End: 2024-04-18
Attending: STUDENT IN AN ORGANIZED HEALTH CARE EDUCATION/TRAINING PROGRAM
Payer: COMMERCIAL

## 2024-04-18 PROCEDURE — 97140 MANUAL THERAPY 1/> REGIONS: CPT

## 2024-04-18 PROCEDURE — 97110 THERAPEUTIC EXERCISES: CPT

## 2024-04-18 NOTE — PROGRESS NOTES
Diagnosis:   Chronic bilateral low back pain with bilateral sciatica (M54.42,M54.41,G89.29)  Tingling in extremities (R20.2)  Chronic neck pain (M54.2,G89.29)  Patellofemoral arthralgia of both knees (M22.2X1,M22.2X2)      Referring Provider: Jorge Huerta  Date of Evaluation:    4/12/24    Precautions:  None Next MD visit:     Date of Surgery: n/a   Insurance Primary/Secondary: BCBS POS / N/A     # Auth Visits: 10            Subjective: some of the stretches do help with the pain she experiences in the low back and side of hip; not too sore after last session working on neck    Pain: 3/10 low back pain       Objective: see flowsheet      Assessment:   Tolerated exercises fairly well. Some increased guarding in hip flexors and tightness posterolateral hip musculature.   Able to ambulate with less reported pain and felt more \"loose\"   Encouraged to keep stretching and other exercises prescribed for neck       Goals:   Pt will improve cervical AROM flexion by 5-8 degrees with less reported restriction and pain to improve tolerance for looking down to tie shoes   Pt will improve cervical AROM (R) & (L) rotation by 5-8 degrees to improve tolerance for turning head to check blind spot while driving  Pt will have improved thoracic PA mobility to WNL to improve cervical ROM as well as promote upright posturing and decreased pain   Pt will demonstrate at least 4/5 (B) UE & LE strength in order to maximize effectiveness and efficiency with functional activities   Pt will be able to  her children with less reported pain and restriction   Pt will be able to perform all ADLs and light household chores/tasks with less pain and restriction   Pt will be independent and compliant with comprehensive HEP to maintain progress achieved in PT      Plan:   Date: 4/16/2024  TX#: 2/10 Date:  4/18/24               TX#: 3/10 Date:                 TX#: 4/ Date:                 TX#: 5/ Date:   Tx#: 6/   Manual (15 min) Manual (25 min)       STM/MFR suboccipitals, cervical paraspinals, UT, LS, scalenes   (R) 1st rib mobilization, grade 3  Prone mid to upper thoracic PA glide, grade 3 Prone lumbosacral distraction   STM/MFR to posterolateral hip musculature, lumbar paraspinals & QL  (L) hip flexor release       TherEx (30 min) TherEx (15 min)      UBE x 6 min NuStep x 6 min (L7)      H/L chin tuck x 15 (5 seconds) Child's pose stretch 30 sec x 3      Seated cervical retraction x 20 (5 second hold) H/L piriformis stretch (knee to shoulder & push knee down) 30 sec x 3      Seated 1st rib mobilization x 20 Assessment & re-assessment of symptoms post treatment      Seated (R) UT & LS stretch 30 sec x 2       Seated upper back stretch 30 sec x 2       Seated scapular retraction x 10 (5 second hold)       Doorway pec stretch 30 sec x 3       HEP:   Access Code: [email or web address]  URL: [email or web address]  Date: 04/12/2024  Prepared by: Chandan Eubanks    Exercises  - Seated Cervical Sidebending Stretch  - 1-2 x daily - 7 x weekly - 3 sets - 30 seconds hold  - Seated Levator Scapulae Stretch  - 1-2 x daily - 7 x weekly - 3 sets - 30 seconds hold  - Supine Hamstring Stretch  - 1-2 x daily - 7 x weekly - 3 sets - 30 seconds hold  - Supine Piriformis Stretch with Foot on Ground  - 1-2 x daily - 7 x weekly - 3 sets - 30 seconds hold  - Supine Figure 4 Piriformis Stretch  - 1-2 x daily - 7 x weekly - 3 sets - 30 seconds hold    Charges: Manual 2; TherEx 1       Total Timed Treatment: 40 min  Total Treatment Time: 40 min

## 2024-04-22 ENCOUNTER — HOSPITAL ENCOUNTER (OUTPATIENT)
Dept: MAMMOGRAPHY | Age: 39
Discharge: HOME | End: 2024-04-22
Attending: STUDENT IN AN ORGANIZED HEALTH CARE EDUCATION/TRAINING PROGRAM
Payer: COMMERCIAL

## 2024-04-22 ENCOUNTER — HOSPITAL ENCOUNTER (OUTPATIENT)
Dept: ULTRASOUND IMAGING | Age: 39
Discharge: HOME | End: 2024-04-22
Attending: STUDENT IN AN ORGANIZED HEALTH CARE EDUCATION/TRAINING PROGRAM
Payer: COMMERCIAL

## 2024-04-22 DIAGNOSIS — R92.2 INCONCLUSIVE MAMMOGRAM: ICD-10-CM

## 2024-04-22 PROCEDURE — 77061 BREAST TOMOSYNTHESIS UNI: CPT | Performed by: STUDENT IN AN ORGANIZED HEALTH CARE EDUCATION/TRAINING PROGRAM

## 2024-04-22 PROCEDURE — 77065 DX MAMMO INCL CAD UNI: CPT | Performed by: STUDENT IN AN ORGANIZED HEALTH CARE EDUCATION/TRAINING PROGRAM

## 2024-04-22 PROCEDURE — 76642 ULTRASOUND BREAST LIMITED: CPT | Performed by: STUDENT IN AN ORGANIZED HEALTH CARE EDUCATION/TRAINING PROGRAM

## 2024-04-22 NOTE — PROGRESS NOTES
Good morning Isabella,     Great news - your mammogram and breast ultrasound results came back benign. There were a few small benign cysts in the right breast which do not require further evaluation. This is a common finding. Your next routine screening mammogram is recommended in 1 year.     Have a nice day,  Dr. Huerta

## 2024-04-23 ENCOUNTER — OFFICE VISIT (OUTPATIENT)
Facility: LOCATION | Age: 39
End: 2024-04-23
Attending: STUDENT IN AN ORGANIZED HEALTH CARE EDUCATION/TRAINING PROGRAM
Payer: COMMERCIAL

## 2024-04-23 PROCEDURE — 97140 MANUAL THERAPY 1/> REGIONS: CPT

## 2024-04-23 PROCEDURE — 97110 THERAPEUTIC EXERCISES: CPT

## 2024-04-23 NOTE — PROGRESS NOTES
Diagnosis:   Chronic bilateral low back pain with bilateral sciatica (M54.42,M54.41,G89.29)  Tingling in extremities (R20.2)  Chronic neck pain (M54.2,G89.29)  Patellofemoral arthralgia of both knees (M22.2X1,M22.2X2)      Referring Provider: Jorge Huerta  Date of Evaluation:    4/12/24    Precautions:  None Next MD visit:     Date of Surgery: n/a   Insurance Primary/Secondary: BCBS POS / N/A     # Auth Visits: 10            Subjective: low back and hips are doing a bit better with stretches. She has increased (L) side neck and arm pain possibly from laying in an unusual position     Pain: 3/10 dull achy pain in the (L) shoulder/upper arm      Objective: see flowsheet      Assessment:   Pt with hypertonicity noted (L) UT/LS musculature - improved with soft tissue treatment.   Short duration cervical traction did seem to help with (L) UE symptoms a bit (consider traction next session)      Goals:   Pt will improve cervical AROM flexion by 5-8 degrees with less reported restriction and pain to improve tolerance for looking down to tie shoes   Pt will improve cervical AROM (R) & (L) rotation by 5-8 degrees to improve tolerance for turning head to check blind spot while driving  Pt will have improved thoracic PA mobility to WNL to improve cervical ROM as well as promote upright posturing and decreased pain   Pt will demonstrate at least 4/5 (B) UE & LE strength in order to maximize effectiveness and efficiency with functional activities   Pt will be able to  her children with less reported pain and restriction   Pt will be able to perform all ADLs and light household chores/tasks with less pain and restriction   Pt will be independent and compliant with comprehensive HEP to maintain progress achieved in PT      Plan:   Date: 4/16/2024  TX#: 2/10 Date:  4/18/24               TX#: 3/10 Date: 4/23/24                TX#: 4/10 Date:                 TX#: 5/ Date:   Tx#: 6/   Manual (15 min) Manual (25 min) Manual (15 min)      STM/MFR suboccipitals, cervical paraspinals, UT, LS, scalenes   (R) 1st rib mobilization, grade 3  Prone mid to upper thoracic PA glide, grade 3 Prone lumbosacral distraction   STM/MFR to posterolateral hip musculature, lumbar paraspinals & QL  (L) hip flexor release  STM/MFR (R) UT/LS  Cervical traction  Prone upper to mid thoracic spine PA glide, grade 3     TherEx (30 min) TherEx (15 min) TherEx (25 min)     UBE x 6 min NuStep x 6 min (L7) Seated cervical retraction x 20 (5 second hold)     H/L chin tuck x 15 (5 seconds) Child's pose stretch 30 sec x 3 Seated (L) UT & LS stretches 30 sec x 3     Seated cervical retraction x 20 (5 second hold) H/L piriformis stretch (knee to shoulder & push knee down) 30 sec x 3 Seated scapular retraction w/shoulder ER 2 x 10     Seated 1st rib mobilization x 20 Assessment & re-assessment of symptoms post treatment Upper back stretch 30 sec x 3     Seated (R) UT & LS stretch 30 sec x 2  Standing scapular retraction w/shoulder extension BTB x 15 (5 second hold)     Seated upper back stretch 30 sec x 2  UBE x 6 min     Seated scapular retraction x 10 (5 second hold)       Doorway pec stretch 30 sec x 3       HEP:   Access Code: [email or web address]  URL: [email or web address]  Date: 04/12/2024  Prepared by: Chandan Eubanks    Exercises  - Seated Cervical Sidebending Stretch  - 1-2 x daily - 7 x weekly - 3 sets - 30 seconds hold  - Seated Levator Scapulae Stretch  - 1-2 x daily - 7 x weekly - 3 sets - 30 seconds hold  - Supine Hamstring Stretch  - 1-2 x daily - 7 x weekly - 3 sets - 30 seconds hold  - Supine Piriformis Stretch with Foot on Ground  - 1-2 x daily - 7 x weekly - 3 sets - 30 seconds hold  - Supine Figure 4 Piriformis Stretch  - 1-2 x daily - 7 x weekly - 3 sets - 30 seconds hold    Charges: Manual 1; TherEx 2       Total Timed Treatment: 40 min  Total Treatment Time: 40 min

## 2024-04-26 ENCOUNTER — APPOINTMENT (OUTPATIENT)
Facility: LOCATION | Age: 39
End: 2024-04-26
Attending: STUDENT IN AN ORGANIZED HEALTH CARE EDUCATION/TRAINING PROGRAM
Payer: COMMERCIAL

## 2024-04-26 ENCOUNTER — TELEPHONE (OUTPATIENT)
Dept: PHYSICAL THERAPY | Facility: HOSPITAL | Age: 39
End: 2024-04-26

## 2024-04-26 NOTE — PROGRESS NOTES
Diagnosis:   Chronic bilateral low back pain with bilateral sciatica (M54.42,M54.41,G89.29)  Tingling in extremities (R20.2)  Chronic neck pain (M54.2,G89.29)  Patellofemoral arthralgia of both knees (M22.2X1,M22.2X2)      Referring Provider: Jorge Huerta  Date of Evaluation:    4/12/24    Precautions:  None Next MD visit:     Date of Surgery: n/a   Insurance Primary/Secondary: BCBS POS / N/A     # Auth Visits: 10            Subjective: ***    Pain: ***      Objective: see flowsheet      Assessment:   ***      Goals:   Pt will improve cervical AROM flexion by 5-8 degrees with less reported restriction and pain to improve tolerance for looking down to tie shoes   Pt will improve cervical AROM (R) & (L) rotation by 5-8 degrees to improve tolerance for turning head to check blind spot while driving  Pt will have improved thoracic PA mobility to WNL to improve cervical ROM as well as promote upright posturing and decreased pain   Pt will demonstrate at least 4/5 (B) UE & LE strength in order to maximize effectiveness and efficiency with functional activities   Pt will be able to  her children with less reported pain and restriction   Pt will be able to perform all ADLs and light household chores/tasks with less pain and restriction   Pt will be independent and compliant with comprehensive HEP to maintain progress achieved in PT      Plan:   Date: 4/16/2024  TX#: 2/10 Date:  4/18/24               TX#: 3/10 Date: 4/23/24                TX#: 4/10 Date:  4/26/24               TX#: 5/10 Date:   Tx#: 6/   Manual (15 min) Manual (25 min) Manual (15 min) Manual (*** min)    STM/MFR suboccipitals, cervical paraspinals, UT, LS, scalenes   (R) 1st rib mobilization, grade 3  Prone mid to upper thoracic PA glide, grade 3 Prone lumbosacral distraction   STM/MFR to posterolateral hip musculature, lumbar paraspinals & QL  (L) hip flexor release  STM/MFR (R) UT/LS  Cervical traction  Prone upper to mid thoracic spine PA glide, grade  3     TherEx (30 min) TherEx (15 min) TherEx (25 min) TherEx (*** min)    UBE x 6 min NuStep x 6 min (L7) Seated cervical retraction x 20 (5 second hold)     H/L chin tuck x 15 (5 seconds) Child's pose stretch 30 sec x 3 Seated (L) UT & LS stretches 30 sec x 3     Seated cervical retraction x 20 (5 second hold) H/L piriformis stretch (knee to shoulder & push knee down) 30 sec x 3 Seated scapular retraction w/shoulder ER 2 x 10     Seated 1st rib mobilization x 20 Assessment & re-assessment of symptoms post treatment Upper back stretch 30 sec x 3     Seated (R) UT & LS stretch 30 sec x 2  Standing scapular retraction w/shoulder extension BTB x 15 (5 second hold)     Seated upper back stretch 30 sec x 2  UBE x 6 min     Seated scapular retraction x 10 (5 second hold)       Doorway pec stretch 30 sec x 3       HEP:   Access Code: [email or web address]  URL: [email or web address]  Date: 04/12/2024  Prepared by: Chandan Eubanks    Exercises  - Seated Cervical Sidebending Stretch  - 1-2 x daily - 7 x weekly - 3 sets - 30 seconds hold  - Seated Levator Scapulae Stretch  - 1-2 x daily - 7 x weekly - 3 sets - 30 seconds hold  - Supine Hamstring Stretch  - 1-2 x daily - 7 x weekly - 3 sets - 30 seconds hold  - Supine Piriformis Stretch with Foot on Ground  - 1-2 x daily - 7 x weekly - 3 sets - 30 seconds hold  - Supine Figure 4 Piriformis Stretch  - 1-2 x daily - 7 x weekly - 3 sets - 30 seconds hold    Charges: Manual 1; TherEx 2       Total Timed Treatment: 40 min  Total Treatment Time: 40 min

## 2024-05-03 ENCOUNTER — OFFICE VISIT (OUTPATIENT)
Facility: LOCATION | Age: 39
End: 2024-05-03
Attending: STUDENT IN AN ORGANIZED HEALTH CARE EDUCATION/TRAINING PROGRAM
Payer: COMMERCIAL

## 2024-05-03 PROCEDURE — 97110 THERAPEUTIC EXERCISES: CPT

## 2024-05-03 PROCEDURE — 97140 MANUAL THERAPY 1/> REGIONS: CPT

## 2024-05-03 NOTE — PROGRESS NOTES
Diagnosis:   Chronic bilateral low back pain with bilateral sciatica (M54.42,M54.41,G89.29)  Tingling in extremities (R20.2)  Chronic neck pain (M54.2,G89.29)  Patellofemoral arthralgia of both knees (M22.2X1,M22.2X2)      Referring Provider: Jorge Huerta  Date of Evaluation:    4/12/24    Precautions:  None Next MD visit:     Date of Surgery: n/a   Insurance Primary/Secondary: BCBS POS / N/A     # Auth Visits: 10            Subjective: stretches have been helping the hips/back as well as the neck and arm     Pain: 1/10 neck and arm pain      Objective: see flowsheet      Assessment:   No (L) arm symptoms today throughout session. Good neck AROM   Stretches appear to be effective for the neck and low back/hip symptoms overall  No reports of increased discomfort with treatment       Goals:   Pt will improve cervical AROM flexion by 5-8 degrees with less reported restriction and pain to improve tolerance for looking down to tie shoes   Pt will improve cervical AROM (R) & (L) rotation by 5-8 degrees to improve tolerance for turning head to check blind spot while driving  Pt will have improved thoracic PA mobility to WNL to improve cervical ROM as well as promote upright posturing and decreased pain   Pt will demonstrate at least 4/5 (B) UE & LE strength in order to maximize effectiveness and efficiency with functional activities   Pt will be able to  her children with less reported pain and restriction   Pt will be able to perform all ADLs and light household chores/tasks with less pain and restriction   Pt will be independent and compliant with comprehensive HEP to maintain progress achieved in PT      Plan: continue to work on HEP for 1-2 weeks and call back for appointments if needed (no formal discharge)   Date: 4/16/2024  TX#: 2/10 Date:  4/18/24               TX#: 3/10 Date: 4/23/24                TX#: 4/10 Date:  5/3/24               TX#: 5/10 Date:   Tx#: 6/   Manual (15 min) Manual (25 min) Manual (15 min)  Manual (15 min)    STM/MFR suboccipitals, cervical paraspinals, UT, LS, scalenes   (R) 1st rib mobilization, grade 3  Prone mid to upper thoracic PA glide, grade 3 Prone lumbosacral distraction   STM/MFR to posterolateral hip musculature, lumbar paraspinals & QL  (L) hip flexor release  STM/MFR (R) UT/LS  Cervical traction  Prone upper to mid thoracic spine PA glide, grade 3 STM/MFR (L) UT/LS  Cervical traction   (L) first rib mobilization, grade 3  Prone upper to mid thoracic PA glide grade 3    TherEx (30 min) TherEx (15 min) TherEx (25 min) TherEx (25 min)    UBE x 6 min NuStep x 6 min (L7) Seated cervical retraction x 20 (5 second hold) H/L chin tuck x 15 (5 second hold)    H/L chin tuck x 15 (5 seconds) Child's pose stretch 30 sec x 3 Seated (L) UT & LS stretches 30 sec x 3 H/L chin tuck w/elevation x 15    Seated cervical retraction x 20 (5 second hold) H/L piriformis stretch (knee to shoulder & push knee down) 30 sec x 3 Seated scapular retraction w/shoulder ER 2 x 10 H/L shoulder horizontal abduction RTB 2 x 10     Seated 1st rib mobilization x 20 Assessment & re-assessment of symptoms post treatment Upper back stretch 30 sec x 3 Seated 1st rib mobilization x 20     Seated (R) UT & LS stretch 30 sec x 2  Standing scapular retraction w/shoulder extension BTB x 15 (5 second hold) Seated UT stretch 30 sec x 3    Seated upper back stretch 30 sec x 2  UBE x 6 min Seated scapular retraction w/shoulder ER 2 x 10     Seated scapular retraction x 10 (5 second hold)       Doorway pec stretch 30 sec x 3       HEP:   Access Code: [email or web address]  URL: [email or web address]  Date: 04/12/2024  Prepared by: Chandan Eubanks    Exercises  - Seated Cervical Sidebending Stretch  - 1-2 x daily - 7 x weekly - 3 sets - 30 seconds hold  - Seated Levator Scapulae Stretch  - 1-2 x daily - 7 x weekly - 3 sets - 30 seconds hold  - Supine Hamstring Stretch  - 1-2 x daily - 7 x weekly - 3 sets - 30 seconds hold  - Supine  Piriformis Stretch with Foot on Ground  - 1-2 x daily - 7 x weekly - 3 sets - 30 seconds hold  - Supine Figure 4 Piriformis Stretch  - 1-2 x daily - 7 x weekly - 3 sets - 30 seconds hold    Charges: Manual 1; TherEx 2       Total Timed Treatment: 40 min  Total Treatment Time: 40 min

## 2024-07-23 ENCOUNTER — PATIENT MESSAGE (OUTPATIENT)
Dept: FAMILY MEDICINE CLINIC | Facility: CLINIC | Age: 39
End: 2024-07-23

## 2024-07-23 NOTE — TELEPHONE ENCOUNTER
From: Isabella Aldana  To: Jorge Huerta  Sent: 7/23/2024 2:34 PM CDT  Subject: Imfection    Good afternoon Dr. Huerta! Hope you’re having a nice summer. I’m having yeast infection symptoms again and it’s been going on for a few days now. It seems I’ve been prone to them around this time of year for the past couple of years. I was hoping you can let me know what I can do to help clear it up and get some relief. Thank you and hope to hear from you soon. Have a great day!     Isabella Aldana

## 2025-03-31 ENCOUNTER — HOSPITAL ENCOUNTER (EMERGENCY)
Age: 40
Discharge: HOME | End: 2025-04-01
Attending: EMERGENCY MEDICINE
Payer: COMMERCIAL

## 2025-03-31 DIAGNOSIS — M54.16 LUMBAR RADICULOPATHY: Primary | ICD-10-CM

## 2025-03-31 PROCEDURE — 99284 EMERGENCY DEPT VISIT MOD MDM: CPT

## 2025-04-01 ENCOUNTER — TELEPHONE (OUTPATIENT)
Dept: FAMILY MEDICINE CLINIC | Facility: CLINIC | Age: 40
End: 2025-04-01

## 2025-04-01 VITALS
RESPIRATION RATE: 18 BRPM | DIASTOLIC BLOOD PRESSURE: 68 MMHG | WEIGHT: 160 LBS | OXYGEN SATURATION: 97 % | TEMPERATURE: 99 F | BODY MASS INDEX: 28.35 KG/M2 | SYSTOLIC BLOOD PRESSURE: 103 MMHG | HEIGHT: 63 IN | HEART RATE: 78 BPM

## 2025-04-01 DIAGNOSIS — Z12.31 ENCOUNTER FOR SCREENING MAMMOGRAM FOR MALIGNANT NEOPLASM OF BREAST: Primary | ICD-10-CM

## 2025-04-01 PROCEDURE — 96375 TX/PRO/DX INJ NEW DRUG ADDON: CPT

## 2025-04-01 PROCEDURE — 96361 HYDRATE IV INFUSION ADD-ON: CPT

## 2025-04-01 PROCEDURE — 96374 THER/PROPH/DIAG INJ IV PUSH: CPT

## 2025-04-01 RX ORDER — KETOROLAC TROMETHAMINE 15 MG/ML
15 INJECTION, SOLUTION INTRAMUSCULAR; INTRAVENOUS ONCE
Status: COMPLETED | OUTPATIENT
Start: 2025-04-01 | End: 2025-04-01

## 2025-04-01 RX ORDER — CYCLOBENZAPRINE HCL 10 MG
10 TABLET ORAL 3 TIMES DAILY PRN
Qty: 20 TABLET | Refills: 0 | Status: SHIPPED | OUTPATIENT
Start: 2025-04-01 | End: 2025-04-08

## 2025-04-01 RX ORDER — PREDNISONE 20 MG/1
40 TABLET ORAL DAILY
Qty: 8 TABLET | Refills: 0 | Status: SHIPPED | OUTPATIENT
Start: 2025-04-01 | End: 2025-04-05

## 2025-04-01 RX ORDER — MORPHINE SULFATE 4 MG/ML
4 INJECTION, SOLUTION INTRAMUSCULAR; INTRAVENOUS ONCE
Status: COMPLETED | OUTPATIENT
Start: 2025-04-01 | End: 2025-04-01

## 2025-04-01 RX ORDER — DEXAMETHASONE SODIUM PHOSPHATE 10 MG/ML
10 INJECTION, SOLUTION INTRAMUSCULAR; INTRAVENOUS ONCE
Status: COMPLETED | OUTPATIENT
Start: 2025-04-01 | End: 2025-04-01

## 2025-04-01 RX ORDER — IBUPROFEN 600 MG/1
600 TABLET, FILM COATED ORAL EVERY 8 HOURS PRN
Qty: 30 TABLET | Refills: 0 | Status: SHIPPED | OUTPATIENT
Start: 2025-04-01 | End: 2025-04-08

## 2025-04-01 RX ORDER — HYDROCODONE BITARTRATE AND ACETAMINOPHEN 5; 325 MG/1; MG/1
1 TABLET ORAL EVERY 8 HOURS PRN
Qty: 10 TABLET | Refills: 0 | Status: SHIPPED | OUTPATIENT
Start: 2025-04-01 | End: 2025-04-06

## 2025-04-01 RX ORDER — DIAZEPAM 10 MG/2ML
5 INJECTION, SOLUTION INTRAMUSCULAR; INTRAVENOUS ONCE
Status: COMPLETED | OUTPATIENT
Start: 2025-04-01 | End: 2025-04-01

## 2025-04-01 NOTE — ED PROVIDER NOTES
Patient Seen in: [facility] [place]      History     Chief Complaint   Patient presents with    Back Pain     Stated Complaint: hurt back and felt sweaty, dizzy    Subjective:   39-year-old female, presents with back pain.  States intermittent back pain going back in 2 years for which she is required physical therapy in the past and got better so she never needed MRI imaging.  She is carrying heavy cases of [employer] water, heavy that she is moving around the ground when she bent forward she felt sudden pain in the right low back that radiates down the right leg.  Similar to her previous exacerbations of pain.  No midline pain.  No saddle anesthesia.  No continence retention.  No fever.  No history of diabetes or back surgery.  No red flag symptoms spinal emergency              Objective:     Past Medical History:    Abdominal pain    Allergic rhinitis    Bloating    DDD (degenerative disc disease), cervical    DJD (degenerative joint disease) of cervical spine    Flatulence/gas pain/belching    Gestational diabetes (HCC)    Heartburn    Indigestion    Menses painful    Nausea    Pain with bowel movements    Vitamin D deficiency              Past Surgical History:   Procedure Laterality Date    Cholecystectomy                  Social History     Socioeconomic History    Marital status:    Tobacco Use    Smoking status: Never    Smokeless tobacco: Never   Vaping Use    Vaping status: Never Used   Substance and Sexual Activity    Alcohol use: Never    Drug use: Never    Sexual activity: Yes     Partners: Male   Other Topics Concern    Caffeine Concern No    Exercise No    Seat Belt No    Special Diet No    Stress Concern No    Weight Concern No                  Physical Exam     ED Triage Vitals [03/31/25 2327]   /71   Pulse 77   Resp 16   Temp 98.7 °F (37.1 °C)   Temp src    SpO2 98 %   O2 Device None (Room air)       Current Vitals:   Vital Signs  BP: 103/68  Pulse: 78  Resp:  18  Temp: 98.7 °F (37.1 °C)    Oxygen Therapy  SpO2: 97 %  O2 Device: None (Room air)        Physical Exam  Vitals and nursing note reviewed.   Constitutional:       Appearance: She is not toxic-appearing.   HENT:      Head: Normocephalic.   Cardiovascular:      Rate and Rhythm: Normal rate.      Heart sounds: Normal heart sounds.   Pulmonary:      Effort: Pulmonary effort is normal. No respiratory distress.      Breath sounds: Normal breath sounds.   Abdominal:      Palpations: Abdomen is soft.   Musculoskeletal:         General: Normal range of motion.      Cervical back: Neck supple.   Skin:     General: Skin is warm and dry.   Neurological:      General: No focal deficit present.      Mental Status: She is alert.      Sensory: No sensory deficit.      Coordination: Coordination normal.      Deep Tendon Reflexes: Reflexes normal.   Psychiatric:         Behavior: Behavior normal.         2+ DTRs, straight leg positive on the right.  Strength intact, dermatomes intact    ED Course   Labs Reviewed - No data to display                MDM        Family at bedside helpful to provide information on the history present illness    External chart review demonstrates outpatient visits in the past about a year ago with lumbar x-rays that were benign overall    Differential diagnosis includes, but not limited to, muscle spasm, sciatica, radiculopathy    39-year-old female with lumbar radiculopathy.  No midline pain or step-off.  No red flag symptoms spinal emergency.  Tenderness in the piriformis and gluteus radicular symptoms.  After treatment here, feels much better.  Up and ambulatory wants to go home.  Given spine follow-up.  Discussed injections, advanced imaging down the road.  She has no red flag symptoms.  She comfortable with that plan.  Family is as well.  Malik Leach, also ibuprofen, prednisone, heating and stretching, supportive care and may need physical therapy again as well.  Discharged home, return  precaution provided, patient in agreement    Patient was screened and evaluated during this visit.  As the treating physician attending to the patient, I determined within reasonable clinical confidence and prior to discharge, that an emergency medical condition was not or was no longer present.  There was no indication for further evaluation, treatment, or admission on an emergency basis.  Comprehensive verbal and written discharge and follow-up instructions were provided to help prevent relapse or worsening.  Patient was instructed to follow-up with their primary care provider for further evaluation and treatment, return immediately to ER for worsening, concerning, new, or changing/persisting symptoms. I discussed the case with the patient and they had no questions, complaints, or concerns.  Patient was comfortable going home.     Per the discharge paperwork, patients are encouraged to and given instructions on how to sign [facility], where they have access to their records, including any/all incidental findings.     This note was prepared using Dragon Medical voice recognition dictation software. As a result errors may occur. When identified these errors have been corrected. While every attempt is made to correct errors during dictation discrepancies may still exist    Note to patient: The 21st Century Cures Act makes medical notes like these available to patients in the interest of transparency. However, this is a medical document intended as peer to peer communication. It is written in medical language and may contain abbreviations or verbiage that are unfamiliar. It may appear blunt or direct. Medical documents are intended to carry relevant information, facts as evident, and the clinical opinion of the practitioner.           Medical Decision Making      Disposition and Plan     Clinical Impression:  1. Lumbar radiculopathy         Disposition:  Discharge  4/1/2025  3:03 am    Follow-up:  Michael Wilkinson  MD Stephen  [address], [address]  [facility] [address]  [phone]    Follow up      Jorge Huerta MD  [address] [address]  [facility] [address]  [phone]    Follow up            Medications Prescribed:  Discharge Medication List as of 4/1/2025  3:04 AM        START taking these medications    Details   HYDROcodone-acetaminophen 5-325 MG Oral Tab Take 1 tablet by mouth every 8 (eight) hours as needed for Pain., Normal, Disp-10 tablet, R-0      cyclobenzaprine 10 MG Oral Tab Take 1 tablet (10 mg total) by mouth 3 (three) times daily as needed for Muscle spasms., Normal, Disp-20 tablet, R-0      predniSONE 20 MG Oral Tab Take 2 tablets (40 mg total) by mouth daily for 4 days., Normal, Disp-8 tablet, R-0      ibuprofen 600 MG Oral Tab Take 1 tablet (600 mg total) by mouth every 8 (eight) hours as needed for Pain or Fever., Normal, Disp-30 tablet, R-0                 Supplementary Documentation:

## 2025-04-01 NOTE — ED INITIAL ASSESSMENT (HPI)
Pt was carrying water downstairs and then bent down and tried to move a box and started having back pain. Felt hot and like she was going to pass out. Now having lower back pain.

## 2025-04-02 ENCOUNTER — OFFICE VISIT (OUTPATIENT)
Dept: SURGERY | Facility: CLINIC | Age: 40
End: 2025-04-02
Payer: COMMERCIAL

## 2025-04-02 DIAGNOSIS — M54.12 CERVICAL RADICULOPATHY: ICD-10-CM

## 2025-04-02 DIAGNOSIS — M54.16 LUMBAR BACK PAIN WITH RADICULOPATHY AFFECTING RIGHT LOWER EXTREMITY: Primary | ICD-10-CM

## 2025-04-02 PROCEDURE — 99214 OFFICE O/P EST MOD 30 MIN: CPT

## 2025-04-02 RX ORDER — BENZONATATE 100 MG/1
CAPSULE ORAL
COMMUNITY
Start: 2024-12-17

## 2025-04-02 NOTE — PATIENT INSTRUCTIONS
Refill policies:    Allow 2-3 business days for refills; controlled substances may take longer.  Contact your pharmacy at least 5 days prior to running out of medication and have them send an electronic request or submit request through the “request refill” option in your [employer] account.  Refills are not addressed on weekends; covering physicians do not authorize routine medications on weekends.  No narcotics or controlled substances are refilled after noon on Fridays or by on call physicians.  By law, narcotics must be electronically prescribed.  A 30 day supply with no refills is the maximum allowed.  If your prescription is due for a refill, you may be due for a follow up appointment.  To best provide you care, patients receiving routine medications need to be seen at least once a year.  Patients receiving narcotic/controlled substance medications need to be seen at least once every 3 months.  In the event that your preferred pharmacy does not have the requested medication in stock (e.g. Backordered), it is your responsibility to find another pharmacy that has the requested medication available.  We will gladly send a new prescription to that pharmacy at your request.    Scheduling Tests:    If your physician has ordered radiology tests such as MRI or CT scans, please contact Central Scheduling at [phone] right away to schedule the test.  Once scheduled, the [facility] Centralized Referral Team will work with your insurance carrier to obtain pre-certification or prior authorization.  Depending on your insurance carrier, approval may take 3-10 days.  It is highly recommended patients assure they have received an authorization before having a test performed.  If test is done without insurance authorization, patient may be responsible for the entire amount billed.      Precertification and Prior Authorizations:  If your physician has recommended that you have a procedure or additional testing performed the [facility]  Centralized Referral Team will contact your insurance carrier to obtain pre-certification or prior authorization.    You are strongly encouraged to contact your insurance carrier to verify that your procedure/test has been approved and is a COVERED benefit.  Although the [facility] Centralized Referral Team does its due diligence, the insurance carrier gives the disclaimer that \"Although the procedure is authorized, this does not guarantee payment.\"    Ultimately the patient is responsible for payment.   Thank you for your understanding in this matter.  Paperwork Completion:  If you require FMLA or disability paperwork for your recovery, please make sure to either drop it off or have it faxed to our office at [phone]. Be sure the form has your name and date of birth on it.  The form will be faxed to our Forms Department and they will complete it for you.  There is a 25$ fee for all forms that need to be filled out.  Please be aware there is a 10-14 day turnaround time.  You will need to sign a release of information (JEWEL) form if your paperwork does not come with one.  You may call the Forms Department with any questions at [phone].  Their fax number is [phone].
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
## 2025-05-01 ENCOUNTER — HOSPITAL ENCOUNTER (OUTPATIENT)
Dept: MRI IMAGING | Facility: HOSPITAL | Age: 40
Discharge: HOME | End: 2025-05-01
Payer: COMMERCIAL

## 2025-05-01 DIAGNOSIS — M54.16 LUMBAR BACK PAIN WITH RADICULOPATHY AFFECTING RIGHT LOWER EXTREMITY: ICD-10-CM

## 2025-05-01 DIAGNOSIS — M54.12 CERVICAL RADICULOPATHY: ICD-10-CM

## 2025-05-01 PROCEDURE — 72141 MRI NECK SPINE W/O DYE: CPT

## 2025-05-01 PROCEDURE — 72148 MRI LUMBAR SPINE W/O DYE: CPT

## 2025-06-25 ENCOUNTER — HOSPITAL ENCOUNTER (OUTPATIENT)
Dept: MAMMOGRAPHY | Age: 40
Discharge: HOME | End: 2025-06-25
Attending: STUDENT IN AN ORGANIZED HEALTH CARE EDUCATION/TRAINING PROGRAM
Payer: COMMERCIAL

## 2025-06-25 DIAGNOSIS — Z12.31 ENCOUNTER FOR SCREENING MAMMOGRAM FOR MALIGNANT NEOPLASM OF BREAST: ICD-10-CM

## 2025-06-25 PROCEDURE — 77063 BREAST TOMOSYNTHESIS BI: CPT | Performed by: STUDENT IN AN ORGANIZED HEALTH CARE EDUCATION/TRAINING PROGRAM

## 2025-06-25 PROCEDURE — 77067 SCR MAMMO BI INCL CAD: CPT | Performed by: STUDENT IN AN ORGANIZED HEALTH CARE EDUCATION/TRAINING PROGRAM

## 2025-07-28 ENCOUNTER — OFFICE VISIT (OUTPATIENT)
Dept: SURGERY | Facility: CLINIC | Age: 40
End: 2025-07-28

## 2025-07-28 DIAGNOSIS — M54.12 CERVICAL RADICULOPATHY: Primary | ICD-10-CM

## 2025-07-28 PROCEDURE — 99215 OFFICE O/P EST HI 40 MIN: CPT

## 2025-07-28 PROCEDURE — 99417 PROLNG OP E/M EACH 15 MIN: CPT

## (undated) DIAGNOSIS — Z00.00 LABORATORY TESTS ORDERED AS PART OF A COMPLETE PHYSICAL EXAM (CPE): ICD-10-CM

## (undated) DIAGNOSIS — R12 HEARTBURN: ICD-10-CM

## (undated) NOTE — LETTER
03/25/24      To whom it may concern,    Please note that Isabella Aldana was diagnosed with respiratory infections in 11/2023 and 12/2023. She had a doctor's appointment on 11/14/23, 11/27/23, and 12/21/23 and an emergency room visit on 12/12/23. She and her family were also ill with a viral infection in 1/2024. These medical conditions and events contributed to delay in the patient being able to start physical therapy, but she is still experiencing musculoskeletal pain and would benefit from initiating physical therapy at this time.    Sincerely,   Jorge Huerta MD

## (undated) NOTE — LETTER
Dear new mom:    We've missed you! The nurses of [facility] have tried to reach you by phone to ask if you had any questions regarding your health or the care of your new little one.     Please feel free to call your doctor with an